# Patient Record
Sex: FEMALE | Race: WHITE | NOT HISPANIC OR LATINO | Employment: FULL TIME | URBAN - NONMETROPOLITAN AREA
[De-identification: names, ages, dates, MRNs, and addresses within clinical notes are randomized per-mention and may not be internally consistent; named-entity substitution may affect disease eponyms.]

---

## 2018-03-27 ENCOUNTER — OFFICE VISIT CONVERTED (OUTPATIENT)
Dept: FAMILY MEDICINE CLINIC | Age: 38
End: 2018-03-27
Attending: NURSE PRACTITIONER

## 2021-05-18 NOTE — PROGRESS NOTES
Lamar Duncan 1980     Office/Outpatient Visit    Visit Date: Tue, Mar 27, 2018 04:13 pm    Provider: Elvia Bryson N.P. (Assistant: Quita Bryson MA)    Location: [facility]        Electronically signed by Elvia Bryson N.P. on  2018 10:00:25 PM                             SUBJECTIVE:        CC:     Ms. Duncan is a 37 year old White female.  Patient complains  of heart fluttering and headaches.;         HPI:         Ms. Duncan presents with palpitations.  This first began approximately 2-3 weeks ago.  She describes the sensation as rapid heart beat and frequent skipped beats.  The frequency of the episodes is once every few days.  The average duration of each episode is few seconds to an hour.  There are no identifiable aggravating factors.  No associated symptoms are reported.      ROS:     CONSTITUTIONAL:  Negative for chills, fatigue, fever, and weight change.      EYES:  Negative for eye changes.      CARDIOVASCULAR:  Negative for chest pain.      RESPIRATORY:  Negative for cough, dyspnea, and hemoptysis.      GASTROINTESTINAL:  Negative for nausea.      NEUROLOGICAL:  Positive for headaches ( severe headache/distal to the temples ).  headaches usually in her neck, today different type headache         PMH/FMH/SH:     Last Reviewed on 3/27/2018 04:23 PM by Elvia Bryson    Past Medical History:     UNREMARKABLE         GYNECOLOGICAL HISTORY:        Contraception: partner is S/P vasectomy;         Surgical History:     NONE         Family History:     Father:    Positive for Aortic Aneurysm;     ; Positive for Lung Cancer;     ; Positive for COPD;     Mother: Healthy     Brother(s): Healthy; 3 brother(s) total     Son(s): Healthy; 1 son(s) total     Daughter(s): Healthy; 1 daughter(s) total     Paternal Grandfather:  at age 80's     Paternal Grandmother: Medical history unknown     Maternal Grandfather:  at age 40's; Cause of death was sepsis     Maternal  Grandmother: Healthy Father: Aortic Aneurysm;  Lung Cancer     Mother: Healthy         Social History:     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              Immunizations:     [employer] Flu P13 10/12/2007         Allergies:     Last Reviewed on 3/27/2018 04:15 PM by Quita Bryson    Sulfas:    Cephalosporins:    Clindamycin HCl: abdominal pain, diarrhea (Adverse Reaction)        Current Medications:     Last Reviewed on 3/27/2018 04:15 PM by Quita Bryson    Multivitamins         OBJECTIVE:        Vitals:         Current: 3/27/2018 4:15:21 PM    Ht:  5 ft, 5 in;  Wt: 147.1 lbs;  BMI: 24.5    T: 97 F (oral);  BP: 116/80 mm Hg (left arm, sitting);  P: 63 bpm (left arm (BP Cuff), sitting);  sCr: 0.8 mg/dL;  GFR: 94.38        Exams:     PHYSICAL EXAM:     GENERAL: vital signs recorded - well developed, well nourished;  no apparent distress;     EYES: PERRLA;     E/N/T: EARS:  normal external auditory canals and tympanic membranes;  grossly normal hearing; NOSE: no sinus tenderness; OROPHARYNX:  normal mucosa, dentition, gingiva, and posterior pharynx;     NECK: carotid exam reveals no bruits;     RESPIRATORY: normal respiratory rate and pattern with no distress; normal breath sounds with no rales, rhonchi, wheezes or rubs;     CARDIOVASCULAR: normal rate; rhythm is regular;  no systolic murmur; no edema;     MUSCULOSKELETAL: no tenderness to palpation of temples;     NEUROLOGIC: GROSSLY INTACT     PSYCHIATRIC:  appropriate affect and demeanor; normal speech pattern; grossly normal memory;         ASSESSMENT           785.1   R00.2  Palpitations              DDx:     784.0   R51  Headache              DDx:         ORDERS:         Meds Prescribed:       Refill of: Tylenol w/Codeine #3 (Acetaminophen/Codeine) Tablet 1 qhs  #1 (One)  tablet(s) Refills: 0         Radiology/Test Orders:       [id number]  Holter monitor connection and disconnection  (In-House)  (48 hour holter placed./tls)                 PLAN:          Palpitations had labs 2 weeks ago, will get me a copy of those labs         TESTS/PROCEDURES:  Will proceed with Holter Monitor 48 hour to be performed/scheduled now.      FOLLOW-UP:    - after 48 holter     RECOMMENDATIONS:    -  avoid caffiene /stimulants           Orders:       [id number]  Holter monitor connection and disconnection  (In-House)  (48 hour holter placed./tls)           Patient Education Handouts:       Premature Ventricular Contractions (PVCs)           Headache she took Ibuprofen without relief, will give her one tyl #3 and see if it helps, can go back to Ibuprofen or try tylenol OTC         FOLLOW-UP: Advised to call if there is no improvement in 1-2 day(s).            Prescriptions:       Refill of: Tylenol w/Codeine #3 (Acetaminophen/Codeine) Tablet 1 qhs  #1 (One) tablet(s) Refills: 0             Patient Recommendations:        For  Palpitations:         FOLLOW-UP: - after 48 holter     RECOMMENDATIONS:    -             CHARGE CAPTURE           **Please note: ICD descriptions below are intended for billing purposes only and may not represent clinical diagnoses**        Primary Diagnosis:         785.1 Palpitations            R00.2    Palpitations              Orders:          82649   Office/outpatient visit; established patient, level 4  (In-House)             79980   Holter monitor connection and disconnection  (In-House)  (48 hour holter placed./tls)         784.0 Headache            R51    Headache        ADDENDUMS:      ____________________________________    Addendum: 04/09/2018 08:56 AM - Melissa Hayward         Visit Note Faxed to:        Tito Long  (Cardiology); Number [phone]

## 2021-07-01 VITALS
SYSTOLIC BLOOD PRESSURE: 116 MMHG | HEIGHT: 65 IN | HEART RATE: 63 BPM | BODY MASS INDEX: 24.51 KG/M2 | DIASTOLIC BLOOD PRESSURE: 80 MMHG | TEMPERATURE: 97 F | WEIGHT: 147.1 LBS

## 2022-03-16 ENCOUNTER — TRANSCRIBE ORDERS (OUTPATIENT)
Dept: ADMINISTRATIVE | Facility: HOSPITAL | Age: 42
End: 2022-03-16

## 2022-03-16 DIAGNOSIS — Z12.31 VISIT FOR SCREENING MAMMOGRAM: Primary | ICD-10-CM

## 2022-04-05 PROBLEM — N87.9 CERVICAL DYSPLASIA: Status: ACTIVE | Noted: 2022-04-05

## 2022-04-06 ENCOUNTER — TRANSCRIBE ORDERS (OUTPATIENT)
Dept: FAMILY MEDICINE CLINIC | Age: 42
End: 2022-04-06

## 2022-04-06 DIAGNOSIS — Z01.818 PREOPERATIVE CLEARANCE: Primary | ICD-10-CM

## 2022-04-18 ENCOUNTER — CLINICAL SUPPORT (OUTPATIENT)
Dept: FAMILY MEDICINE CLINIC | Age: 42
End: 2022-04-18

## 2022-04-18 DIAGNOSIS — Z01.818 PREOPERATIVE CLEARANCE: ICD-10-CM

## 2022-04-18 PROCEDURE — 99211 OFF/OP EST MAY X REQ PHY/QHP: CPT | Performed by: FAMILY MEDICINE

## 2022-04-18 PROCEDURE — U0004 COV-19 TEST NON-CDC HGH THRU: HCPCS | Performed by: OBSTETRICS & GYNECOLOGY

## 2022-04-19 LAB — SARS-COV-2 RNA PNL SPEC NAA+PROBE: NOT DETECTED

## 2022-04-28 ENCOUNTER — APPOINTMENT (OUTPATIENT)
Dept: MAMMOGRAPHY | Facility: HOSPITAL | Age: 42
End: 2022-04-28

## 2022-06-08 PROBLEM — O02.1 MISSED ABORTION: Status: ACTIVE | Noted: 2022-06-08

## 2022-06-10 ENCOUNTER — CLINICAL SUPPORT (OUTPATIENT)
Dept: FAMILY MEDICINE CLINIC | Age: 42
End: 2022-06-10

## 2022-06-10 DIAGNOSIS — Z01.818 PREOPERATIVE CLEARANCE: Primary | ICD-10-CM

## 2022-06-10 LAB — SARS-COV-2 RNA PNL SPEC NAA+PROBE: NOT DETECTED

## 2022-06-10 PROCEDURE — U0004 COV-19 TEST NON-CDC HGH THRU: HCPCS | Performed by: FAMILY MEDICINE

## 2022-06-10 PROCEDURE — 99211 OFF/OP EST MAY X REQ PHY/QHP: CPT | Performed by: FAMILY MEDICINE

## 2022-06-13 NOTE — H&P
[facility]   PREOPERATIVE HISTORY AND PHYSICAL    Patient Name:Lamar Duncan  : 1980  MRN: [id number]  Primary Care Physician: Elvia Bryson APRN  Date of admission: 2022    Subjective   Subjective     Chief Complaint:  Missed .  Cervical dysplasia.     History of Present Illness:   History of Present Illness  Lamar Duncan is a 41 y.o. female with missed  and cervical dysplasia  who presents for preoperative evaluation. She is scheduled for DILATATION AND CURETTAGE WITH SUCTION (N/A), LOOP ELECTROCAUTERY EXCISION PROCEDURE (N/A).    Review of Systems :Negative    Personal History     Past Medical History:   Diagnosis Date   • Cervical dysplasia    • Missed     • MVP (mitral valve prolapse)     no c/o cp or soa, follows w/ a.  osiris       History reviewed. No pertinent surgical history.    Family History: Her family history is not on file.     Social History: She  reports that she has never smoked. She has never used smokeless tobacco. She reports that she does not drink alcohol and does not use drugs.    Home Medications:       Allergies:  She is allergic to bactrim [sulfamethoxazole-trimethoprim].    Objective    Objective     Vitals:    AFVSS    Physical Exam:  Lungs:CTA shanita  Heart:RRR  Abdomen:Soft NT    Assessment & Plan   Assessment / Plan     Brief Patient Summary:  Lamar Duncan is a 41 y.o. female who presents for preoperative evaluation.    Pre-Op Diagnosis Codes:     * Missed  [O02.1]    Active Hospital Problems:  Active Hospital Problems    Diagnosis    • **Missed       Plan:   Procedure(s):  DILATATION AND CURETTAGE WITH SUCTION  LOOP ELECTROCAUTERY EXCISION PROCEDURE    The risks, benefits, and alternatives of the procedure were discussed in detail with the patient and questions were answered. No guarantees were made or implied. Informed consent was obtained.

## 2022-06-14 ENCOUNTER — ANESTHESIA (OUTPATIENT)
Dept: PERIOP | Facility: HOSPITAL | Age: 42
End: 2022-06-14

## 2022-06-14 ENCOUNTER — HOSPITAL ENCOUNTER (OUTPATIENT)
Facility: HOSPITAL | Age: 42
Setting detail: HOSPITAL OUTPATIENT SURGERY
Discharge: HOME | End: 2022-06-14
Attending: OBSTETRICS & GYNECOLOGY | Admitting: OBSTETRICS & GYNECOLOGY

## 2022-06-14 ENCOUNTER — ANESTHESIA EVENT (OUTPATIENT)
Dept: PERIOP | Facility: HOSPITAL | Age: 42
End: 2022-06-14

## 2022-06-14 VITALS
WEIGHT: 145.94 LBS | BODY MASS INDEX: 24.32 KG/M2 | SYSTOLIC BLOOD PRESSURE: 96 MMHG | HEART RATE: 54 BPM | DIASTOLIC BLOOD PRESSURE: 61 MMHG | HEIGHT: 65 IN | OXYGEN SATURATION: 100 % | TEMPERATURE: 97.6 F | RESPIRATION RATE: 20 BRPM

## 2022-06-14 DIAGNOSIS — O02.1 MISSED ABORTION: ICD-10-CM

## 2022-06-14 DIAGNOSIS — N87.9 CERVICAL DYSPLASIA: Primary | ICD-10-CM

## 2022-06-14 PROCEDURE — 25010000002 PROPOFOL 10 MG/ML EMULSION: Performed by: NURSE ANESTHETIST, CERTIFIED REGISTERED

## 2022-06-14 PROCEDURE — 25010000002 ONDANSETRON PER 1 MG: Performed by: NURSE ANESTHETIST, CERTIFIED REGISTERED

## 2022-06-14 PROCEDURE — 25010000002 DEXAMETHASONE PER 1 MG: Performed by: NURSE ANESTHETIST, CERTIFIED REGISTERED

## 2022-06-14 PROCEDURE — 25010000002 FENTANYL CITRATE (PF) 50 MCG/ML SOLUTION: Performed by: NURSE ANESTHETIST, CERTIFIED REGISTERED

## 2022-06-14 PROCEDURE — 88307 TISSUE EXAM BY PATHOLOGIST: CPT | Performed by: OBSTETRICS & GYNECOLOGY

## 2022-06-14 PROCEDURE — 88305 TISSUE EXAM BY PATHOLOGIST: CPT | Performed by: OBSTETRICS & GYNECOLOGY

## 2022-06-14 PROCEDURE — 25010000002 MIDAZOLAM PER 1 MG: Performed by: STUDENT IN AN ORGANIZED HEALTH CARE EDUCATION/TRAINING PROGRAM

## 2022-06-14 PROCEDURE — 25010000002 CEFAZOLIN IN DEXTROSE 2-4 GM/100ML-% SOLUTION: Performed by: OBSTETRICS & GYNECOLOGY

## 2022-06-14 RX ORDER — SODIUM CHLORIDE, SODIUM LACTATE, POTASSIUM CHLORIDE, CALCIUM CHLORIDE 600; 310; 30; 20 MG/100ML; MG/100ML; MG/100ML; MG/100ML
125 INJECTION, SOLUTION INTRAVENOUS CONTINUOUS
Status: DISCONTINUED | OUTPATIENT
Start: 2022-06-14 | End: 2022-06-14 | Stop reason: HOSPADM

## 2022-06-14 RX ORDER — GLYCOPYRROLATE 0.2 MG/ML
INJECTION INTRAMUSCULAR; INTRAVENOUS AS NEEDED
Status: DISCONTINUED | OUTPATIENT
Start: 2022-06-14 | End: 2022-06-14 | Stop reason: SURG

## 2022-06-14 RX ORDER — IBUPROFEN 600 MG/1
600 TABLET ORAL EVERY 6 HOURS PRN
Status: DISCONTINUED | OUTPATIENT
Start: 2022-06-14 | End: 2022-06-14 | Stop reason: HOSPADM

## 2022-06-14 RX ORDER — SODIUM CHLORIDE 0.9 % (FLUSH) 0.9 %
3 SYRINGE (ML) INJECTION EVERY 12 HOURS SCHEDULED
Status: DISCONTINUED | OUTPATIENT
Start: 2022-06-14 | End: 2022-06-14 | Stop reason: HOSPADM

## 2022-06-14 RX ORDER — PROMETHAZINE HYDROCHLORIDE 25 MG/1
25 SUPPOSITORY RECTAL ONCE AS NEEDED
Status: DISCONTINUED | OUTPATIENT
Start: 2022-06-14 | End: 2022-06-14 | Stop reason: HOSPADM

## 2022-06-14 RX ORDER — SODIUM CHLORIDE 0.9 % (FLUSH) 0.9 %
10 SYRINGE (ML) INJECTION AS NEEDED
Status: DISCONTINUED | OUTPATIENT
Start: 2022-06-14 | End: 2022-06-14 | Stop reason: HOSPADM

## 2022-06-14 RX ORDER — LIDOCAINE HYDROCHLORIDE 20 MG/ML
INJECTION, SOLUTION EPIDURAL; INFILTRATION; INTRACAUDAL; PERINEURAL AS NEEDED
Status: DISCONTINUED | OUTPATIENT
Start: 2022-06-14 | End: 2022-06-14 | Stop reason: SURG

## 2022-06-14 RX ORDER — ONDANSETRON 2 MG/ML
4 INJECTION INTRAMUSCULAR; INTRAVENOUS ONCE AS NEEDED
Status: DISCONTINUED | OUTPATIENT
Start: 2022-06-14 | End: 2022-06-14 | Stop reason: HOSPADM

## 2022-06-14 RX ORDER — CEFAZOLIN SODIUM 2 G/100ML
2 INJECTION, SOLUTION INTRAVENOUS ONCE
Status: COMPLETED | OUTPATIENT
Start: 2022-06-14 | End: 2022-06-14

## 2022-06-14 RX ORDER — LUGOLS 0.4 G/G
LIQUID TOPICAL AS NEEDED
Status: DISCONTINUED | OUTPATIENT
Start: 2022-06-14 | End: 2022-06-14 | Stop reason: HOSPADM

## 2022-06-14 RX ORDER — ONDANSETRON 2 MG/ML
INJECTION INTRAMUSCULAR; INTRAVENOUS AS NEEDED
Status: DISCONTINUED | OUTPATIENT
Start: 2022-06-14 | End: 2022-06-14 | Stop reason: SURG

## 2022-06-14 RX ORDER — IBUPROFEN 600 MG/1
600 TABLET ORAL EVERY 6 HOURS PRN
Qty: 30 TABLET | Refills: 0 | Status: SHIPPED | OUTPATIENT
Start: 2022-06-14 | End: 2023-03-29

## 2022-06-14 RX ORDER — PROPOFOL 10 MG/ML
VIAL (ML) INTRAVENOUS AS NEEDED
Status: DISCONTINUED | OUTPATIENT
Start: 2022-06-14 | End: 2022-06-14 | Stop reason: SURG

## 2022-06-14 RX ORDER — DEXMEDETOMIDINE HYDROCHLORIDE 100 UG/ML
INJECTION, SOLUTION INTRAVENOUS AS NEEDED
Status: DISCONTINUED | OUTPATIENT
Start: 2022-06-14 | End: 2022-06-14 | Stop reason: SURG

## 2022-06-14 RX ORDER — PROMETHAZINE HYDROCHLORIDE 12.5 MG/1
25 TABLET ORAL ONCE AS NEEDED
Status: DISCONTINUED | OUTPATIENT
Start: 2022-06-14 | End: 2022-06-14 | Stop reason: HOSPADM

## 2022-06-14 RX ORDER — MEPERIDINE HYDROCHLORIDE 25 MG/ML
12.5 INJECTION INTRAMUSCULAR; INTRAVENOUS; SUBCUTANEOUS
Status: DISCONTINUED | OUTPATIENT
Start: 2022-06-14 | End: 2022-06-14 | Stop reason: HOSPADM

## 2022-06-14 RX ORDER — SCOLOPAMINE TRANSDERMAL SYSTEM 1 MG/1
1 PATCH, EXTENDED RELEASE TRANSDERMAL ONCE
Status: DISCONTINUED | OUTPATIENT
Start: 2022-06-14 | End: 2022-06-14 | Stop reason: HOSPADM

## 2022-06-14 RX ORDER — MIDAZOLAM HYDROCHLORIDE 1 MG/ML
2 INJECTION INTRAMUSCULAR; INTRAVENOUS ONCE
Status: COMPLETED | OUTPATIENT
Start: 2022-06-14 | End: 2022-06-14

## 2022-06-14 RX ORDER — ACETAMINOPHEN 500 MG
1000 TABLET ORAL ONCE
Status: COMPLETED | OUTPATIENT
Start: 2022-06-14 | End: 2022-06-14

## 2022-06-14 RX ORDER — SODIUM CHLORIDE, SODIUM LACTATE, POTASSIUM CHLORIDE, CALCIUM CHLORIDE 600; 310; 30; 20 MG/100ML; MG/100ML; MG/100ML; MG/100ML
9 INJECTION, SOLUTION INTRAVENOUS CONTINUOUS PRN
Status: DISCONTINUED | OUTPATIENT
Start: 2022-06-14 | End: 2022-06-14 | Stop reason: HOSPADM

## 2022-06-14 RX ORDER — DEXAMETHASONE SODIUM PHOSPHATE 4 MG/ML
INJECTION, SOLUTION INTRA-ARTICULAR; INTRALESIONAL; INTRAMUSCULAR; INTRAVENOUS; SOFT TISSUE AS NEEDED
Status: DISCONTINUED | OUTPATIENT
Start: 2022-06-14 | End: 2022-06-14 | Stop reason: SURG

## 2022-06-14 RX ORDER — FENTANYL CITRATE 50 UG/ML
INJECTION, SOLUTION INTRAMUSCULAR; INTRAVENOUS AS NEEDED
Status: DISCONTINUED | OUTPATIENT
Start: 2022-06-14 | End: 2022-06-14 | Stop reason: SURG

## 2022-06-14 RX ORDER — OXYCODONE HYDROCHLORIDE 5 MG/1
5 TABLET ORAL
Status: DISCONTINUED | OUTPATIENT
Start: 2022-06-14 | End: 2022-06-14 | Stop reason: HOSPADM

## 2022-06-14 RX ORDER — OXYCODONE HYDROCHLORIDE AND ACETAMINOPHEN 5; 325 MG/1; MG/1
1 TABLET ORAL EVERY 4 HOURS PRN
Qty: 15 TABLET | Refills: 0 | Status: SHIPPED | OUTPATIENT
Start: 2022-06-14 | End: 2023-03-29

## 2022-06-14 RX ADMIN — LIDOCAINE HYDROCHLORIDE 100 MG: 20 INJECTION, SOLUTION EPIDURAL; INFILTRATION; INTRACAUDAL; PERINEURAL at 08:43

## 2022-06-14 RX ADMIN — MIDAZOLAM HYDROCHLORIDE 2 MG: 1 INJECTION, SOLUTION INTRAMUSCULAR; INTRAVENOUS at 08:05

## 2022-06-14 RX ADMIN — GLYCOPYRROLATE 0.2 MG: 0.2 INJECTION INTRAMUSCULAR; INTRAVENOUS at 08:49

## 2022-06-14 RX ADMIN — SCOPALAMINE 1 PATCH: 1 PATCH, EXTENDED RELEASE TRANSDERMAL at 08:05

## 2022-06-14 RX ADMIN — PROPOFOL 150 MG: 10 INJECTION, EMULSION INTRAVENOUS at 08:43

## 2022-06-14 RX ADMIN — DEXMEDETOMIDINE HYDROCHLORIDE 10 MCG: 100 INJECTION, SOLUTION, CONCENTRATE INTRAVENOUS at 09:05

## 2022-06-14 RX ADMIN — CEFAZOLIN SODIUM 2 G: 2 INJECTION, SOLUTION INTRAVENOUS at 08:44

## 2022-06-14 RX ADMIN — PROPOFOL 200 MCG/KG/MIN: 10 INJECTION, EMULSION INTRAVENOUS at 08:44

## 2022-06-14 RX ADMIN — DEXMEDETOMIDINE HYDROCHLORIDE 10 MCG: 100 INJECTION, SOLUTION, CONCENTRATE INTRAVENOUS at 08:47

## 2022-06-14 RX ADMIN — ACETAMINOPHEN 1000 MG: 500 TABLET ORAL at 08:05

## 2022-06-14 RX ADMIN — SODIUM CHLORIDE, POTASSIUM CHLORIDE, SODIUM LACTATE AND CALCIUM CHLORIDE 9 ML/HR: 600; 310; 30; 20 INJECTION, SOLUTION INTRAVENOUS at 08:04

## 2022-06-14 RX ADMIN — ONDANSETRON 4 MG: 2 INJECTION INTRAMUSCULAR; INTRAVENOUS at 09:05

## 2022-06-14 RX ADMIN — FENTANYL CITRATE 75 MCG: 50 INJECTION, SOLUTION INTRAMUSCULAR; INTRAVENOUS at 08:50

## 2022-06-14 RX ADMIN — FENTANYL CITRATE 25 MCG: 50 INJECTION, SOLUTION INTRAMUSCULAR; INTRAVENOUS at 08:47

## 2022-06-14 RX ADMIN — DEXAMETHASONE SODIUM PHOSPHATE 8 MG: 4 INJECTION, SOLUTION INTRA-ARTICULAR; INTRALESIONAL; INTRAMUSCULAR; INTRAVENOUS; SOFT TISSUE at 08:49

## 2022-06-14 NOTE — ANESTHESIA PREPROCEDURE EVALUATION
Anesthesia Evaluation     Patient summary reviewed and Nursing notes reviewed   no history of anesthetic complications:  NPO Solid Status: > 8 hours  NPO Liquid Status: > 2 hours           Airway   Mallampati: II  TM distance: >3 FB  No difficulty expected  Dental - normal exam     Pulmonary - negative pulmonary ROS and normal exam   Cardiovascular - normal exam  Exercise tolerance: good (4-7 METS)    (+) valvular problems/murmurs MVP,       Neuro/Psych- negative ROS  GI/Hepatic/Renal/Endo - negative ROS     Musculoskeletal (-) negative ROS    Abdominal  - normal exam   Substance History - negative use     OB/GYN      Comment: Missed   Cervical dysplasia      Other - negative ROS       ROS/Med Hx Other: 11 weeks gestation                  Anesthesia Plan    ASA 2 - emergent     general     (Patient understands anesthesia not responsible for dental damage.)  intravenous induction     Anesthetic plan, risks, benefits, and alternatives have been provided, discussed and informed consent has been obtained with: patient.    Plan discussed with CRNA.        CODE STATUS:

## 2022-06-14 NOTE — OP NOTE
DILATATION AND CURETTAGE WITH SUCTION, LOOP ELECTROCAUTERY EXCISION PROCEDURE  Procedure Report    Patient Name:  Lamar Duncan  YOB: 1980    Date of Surgery:  2022     Indications: Patient is a 41-year-old female with missed .  She also has cervical dysplasia.  She was counseled by option she elected to undergo suction D&C and loop electrode excision procedures.  Risks of the surgery include infection bleeding damage to internal organs including bladder bowel vessels nerves or blood transfusion was cussed patient she consented.    Pre-op Diagnosis:   Missed  [O02.1]   Cervical dysplasia       Post-Op Diagnosis Codes:     * Missed  [O02.1]   Cervical dysplasia    Procedure/CPT® Codes:      Procedure(s):  DILATATION AND CURETTAGE WITH SUCTION  LOOP ELECTROCAUTERY EXCISION PROCEDURE    Staff:  Surgeon(s):  Willie Morales MD         Anesthesia: Choice    Estimated Blood Loss: 20 mL    Implants:    Nothing was implanted during the procedure    Specimen:          Specimens     ID Source Type Tests Collected By Collected At Frozen?    A Products of Conception Tissue · TISSUE PATHOLOGY EXAM   Willie Morales MD 22 0906 No    Description: PRODUCTS OF CONCEPTION     This specimen was not marked as sent.    B Cervix Tissue · TISSUE PATHOLOGY EXAM   Willie Morales MD 22 0907     Description: CERVICAL BIOPSY     This specimen was not marked as sent.              Findings: Uterus sounded to 10 cm.  Large amounts of products of conception obtained.  No gross cervical lesions noted.    Complications: None    Description of Procedure: Patient was taken to the operating room where general anesthesia was obtained with any difficulty.  She was then prepped and draped in normal sterile fashion in the lithotomy position.  A coated speculum was placed in vagina and the cervix was grasped with a single-tooth tenaculum posteriorly.  Uterus sounded to about 10 cm.  Cervix was  then serially dilated.  8 mm curved suction curette was introduced into the endometrial cavity and suction was performed.  This was followed by sharp curettage in the endometrial cavity.  The suction curette was reintroduced 1 more time and further suction was performed.  At this time tenaculum was removed from the cervix and hemostasis was assured.  Attention was then turned to performing the LEEP procedure.  Speculum was attached to a smoke evacuator.  LEEP biopsy was performed without any difficulty.  Ball cautery was used to obtain hemostasis.  In addition Monsel solution was placed on the LEEP bed to ensure further hemostasis.  At this time all instruments removed from the vagina.  Sponge laps and needle counts were correct x2.  Patient was taken to the recovery in stable condition.          Willie Morales MD     Date: 6/14/2022  Time: 09:12 EDT

## 2022-06-14 NOTE — ANESTHESIA POSTPROCEDURE EVALUATION
Patient: Lamar Duncan    Procedure Summary     Date: 22 Room / Location: [facility]  [facility] MA[facility] OR    Anesthesia Start: 837 Anesthesia Stop: 924    Procedures:       DILATATION AND CURETTAGE WITH SUCTION (N/A Vagina)      LOOP ELECTROCAUTERY EXCISION PROCEDURE (N/A Cervix) Diagnosis:       Missed       (Missed  [O02.1])    Surgeons: Willie Morales MD Provider: Latia Mcneill MD    Anesthesia Type: general ASA Status: 2 - Emergent          Anesthesia Type: general    Vitals  Vitals Value Taken Time   BP 93/59 22 0945   Temp 36.3 °C (97.3 °F) 22 0945   Pulse 65 22 0950   Resp 16 22 0945   SpO2 97 % 22 0950   Vitals shown include unvalidated device data.        Post Anesthesia Care and Evaluation    Patient location during evaluation: bedside  Patient participation: complete - patient participated  Level of consciousness: awake  Pain score: 0  Pain management: adequate    Airway patency: patent  Anesthetic complications: No anesthetic complications  PONV Status: none  Cardiovascular status: acceptable and stable  Respiratory status: acceptable and room air  Hydration status: acceptable    Comments: An Anesthesiologist personally participated in the most demanding procedures (including induction and emergence if applicable) in the anesthesia plan, monitored the course of anesthesia administration at frequent intervals and remained physically present and available for immediate diagnosis and treatment of emergencies.

## 2022-06-14 NOTE — DISCHARGE INSTRUCTIONS
DISCHARGE INSTRUCTIONS  GYNECOLOGICAL  PROCEDURES      For your surgery you had:  General anesthesia (you may have a sore throat for the first 24 hours)  IV sedation  Local anesthesia  Monitored anesthesia care  You received a medicated patch for nausea prevention today (behind your ear). It is recommended that you remove it 24-48 hours post-operatively. It must be removed within 72 hours.  You received an anesthesia medication today that can cause hormonal forms of birth control to be ineffective. You should use a different form of birth control (to prevent pregnancy) for 7 days.   You may experience dizziness, drowsiness, or lightheadedness for several hours following surgery.  Do not stay alone today or tonight.  Limit your activity for 24 hours.  Resume your diet slowly.  Follow any special dietary instructions you may have been given by your doctor.  You should not drive or operate machinery, drink alcohol, or sign legally binding documents for 24 hours or while you are taking pain medication.  Last dose of pain medication was given at:  .  NOTIFY YOUR DOCTOR IF YOU EXPERIENCE ANY OF THE FOLLOWING:  Temperature greater than 101 degrees Fahrenheit  Shaking Chills  Redness or excessive drainage from incision  Nausea, vomiting and/or pain that is not controlled by prescribed medications  Increase in bleeding or bleeding that is excessive  Unable to urinate in 6 hours after surgery  If unable to reach your doctor, please go to the closest Emergency Room [] Remove dressing in:     [] You may resume intercourse and the use of tampons as your physician has instructed you.  [] Nothing in the vagina for 2 weeks to include intercourse, douches, or tampons.  [] Vaginal bleeding may be expected for several days with flow decreasing with time and never any heavier than a normal   period.  If you have an ablation, vaginal discharge is expected after bleeding stops.   If you have foul smelling discharge, notify your  physician.  Medications per physician instructions as indicated on Discharge Medication Information Sheet.  You should see   for follow-up care   on   .  Phone number:      SPECIAL INSTRUCTIONS:

## 2022-06-15 LAB
CYTO UR: NORMAL
LAB AP CASE REPORT: NORMAL
LAB AP CLINICAL INFORMATION: NORMAL
PATH REPORT.FINAL DX SPEC: NORMAL
PATH REPORT.GROSS SPEC: NORMAL

## 2022-06-29 ENCOUNTER — TELEPHONE (OUTPATIENT)
Dept: LACTATION | Facility: HOSPITAL | Age: 42
End: 2022-06-29

## 2022-06-29 NOTE — TELEPHONE ENCOUNTER
FSN called to check on this patient's recovery from her D&C and pregnancy loss. She states she is better but did have some very rough days. She has good support from her friends. FSN provided her contact information to be able to help with any concerns/needs that she should have in the future.

## 2022-09-12 ENCOUNTER — HOSPITAL ENCOUNTER (OUTPATIENT)
Dept: MAMMOGRAPHY | Facility: HOSPITAL | Age: 42
Discharge: HOME | End: 2022-09-12
Admitting: OBSTETRICS & GYNECOLOGY

## 2022-09-12 DIAGNOSIS — Z12.31 VISIT FOR SCREENING MAMMOGRAM: ICD-10-CM

## 2022-09-12 PROCEDURE — 77063 BREAST TOMOSYNTHESIS BI: CPT

## 2022-09-12 PROCEDURE — 77067 SCR MAMMO BI INCL CAD: CPT

## 2023-03-27 NOTE — PROGRESS NOTES
Chief Complaint  No chief complaint on file.    Subjective          Lamar Kirk presents to [facility]  History of Present Illness  She is here for physical and to reestablish care.  It has been over 5 years since she was last seen by our practice.    Her tetanus is not up-to-date.  She has not had the COVID vaccines.  Mammogram is up-to-date and benign.      Objective   Vital Signs:   There were no vitals taken for this visit.    Physical Exam   Result Review :   The following data was reviewed by: JUDD Galeano on 03/29/2023:  Mammo Screening Digital Tomosynthesis Bilateral With CAD (09/12/2022 15:09)                  Assessment and Plan    Diagnoses and all orders for this visit:    1. Encounter to establish care (Primary)          Follow Up   No follow-ups on file.  Patient was given instructions and counseling regarding her condition or for health maintenance advice. Please see specific information pulled into the AVS if appropriate.

## 2023-03-29 ENCOUNTER — OFFICE VISIT (OUTPATIENT)
Dept: FAMILY MEDICINE CLINIC | Age: 43
End: 2023-03-29
Payer: COMMERCIAL

## 2023-03-29 ENCOUNTER — LAB (OUTPATIENT)
Dept: LAB | Facility: HOSPITAL | Age: 43
End: 2023-03-29
Payer: COMMERCIAL

## 2023-03-29 VITALS
BODY MASS INDEX: 24.62 KG/M2 | TEMPERATURE: 97.5 F | DIASTOLIC BLOOD PRESSURE: 75 MMHG | HEART RATE: 64 BPM | OXYGEN SATURATION: 100 % | SYSTOLIC BLOOD PRESSURE: 109 MMHG | WEIGHT: 147.8 LBS | HEIGHT: 65 IN

## 2023-03-29 DIAGNOSIS — L65.9 THINNING HAIR: ICD-10-CM

## 2023-03-29 DIAGNOSIS — Z00.00 WELLNESS EXAMINATION: ICD-10-CM

## 2023-03-29 DIAGNOSIS — Z00.00 WELLNESS EXAMINATION: Primary | ICD-10-CM

## 2023-03-29 LAB
ALBUMIN SERPL-MCNC: 4.5 G/DL (ref 3.5–5.2)
ALBUMIN/GLOB SERPL: 1.6 G/DL
ALP SERPL-CCNC: 37 U/L (ref 39–117)
ALT SERPL W P-5'-P-CCNC: 23 U/L (ref 1–33)
ANION GAP SERPL CALCULATED.3IONS-SCNC: 14.7 MMOL/L (ref 5–15)
AST SERPL-CCNC: 26 U/L (ref 1–32)
BILIRUB SERPL-MCNC: 0.4 MG/DL (ref 0–1.2)
BUN SERPL-MCNC: 9 MG/DL (ref 6–20)
BUN/CREAT SERPL: 11.4 (ref 7–25)
CALCIUM SPEC-SCNC: 9.7 MG/DL (ref 8.6–10.5)
CHLORIDE SERPL-SCNC: 99 MMOL/L (ref 98–107)
CHOLEST SERPL-MCNC: 202 MG/DL (ref 0–200)
CO2 SERPL-SCNC: 25.3 MMOL/L (ref 22–29)
CREAT SERPL-MCNC: 0.79 MG/DL (ref 0.57–1)
DEPRECATED RDW RBC AUTO: 40.1 FL (ref 37–54)
EGFRCR SERPLBLD CKD-EPI 2021: 95.3 ML/MIN/1.73
ERYTHROCYTE [DISTWIDTH] IN BLOOD BY AUTOMATED COUNT: 12 % (ref 12.3–15.4)
GLOBULIN UR ELPH-MCNC: 2.9 GM/DL
GLUCOSE SERPL-MCNC: 90 MG/DL (ref 65–99)
HCT VFR BLD AUTO: 39.7 % (ref 34–46.6)
HDLC SERPL-MCNC: 75 MG/DL (ref 40–60)
HGB BLD-MCNC: 13.6 G/DL (ref 12–15.9)
LDLC SERPL CALC-MCNC: 114 MG/DL (ref 0–100)
LDLC/HDLC SERPL: 1.5 {RATIO}
MCH RBC QN AUTO: 31.4 PG (ref 26.6–33)
MCHC RBC AUTO-ENTMCNC: 34.3 G/DL (ref 31.5–35.7)
MCV RBC AUTO: 91.7 FL (ref 79–97)
PLATELET # BLD AUTO: 330 10*3/MM3 (ref 140–450)
PMV BLD AUTO: 9.8 FL (ref 6–12)
POTASSIUM SERPL-SCNC: 4.7 MMOL/L (ref 3.5–5.2)
PROT SERPL-MCNC: 7.4 G/DL (ref 6–8.5)
RBC # BLD AUTO: 4.33 10*6/MM3 (ref 3.77–5.28)
SODIUM SERPL-SCNC: 139 MMOL/L (ref 136–145)
TRIGL SERPL-MCNC: 71 MG/DL (ref 0–150)
TSH SERPL DL<=0.05 MIU/L-ACNC: 1.72 UIU/ML (ref 0.27–4.2)
VLDLC SERPL-MCNC: 13 MG/DL (ref 5–40)
WBC NRBC COR # BLD: 6.33 10*3/MM3 (ref 3.4–10.8)

## 2023-03-29 PROCEDURE — 36415 COLL VENOUS BLD VENIPUNCTURE: CPT

## 2023-03-29 PROCEDURE — 80050 GENERAL HEALTH PANEL: CPT

## 2023-03-29 PROCEDURE — 99396 PREV VISIT EST AGE 40-64: CPT | Performed by: NURSE PRACTITIONER

## 2023-03-29 PROCEDURE — 80061 LIPID PANEL: CPT

## 2023-03-29 NOTE — PROGRESS NOTES
Chief Complaint  Establish Care and Labs Only (Pt states her hair is thinning and her nails thin ).    Subjective          Lamar Kirk presents to [facility]  History of Present Illness  She reports thinning of hair and nails has been a chronic issue. She denies hx of thyroid issues or anemia. States she has a well-balanced diet and exercises routinely. Also denies any stress at this time.       Past Medical History:   • Cervical dysplasia   • Missed    • MVP (mitral valve prolapse)    no c/o cp or soa, follows w/ a.  [place]       Allergies   Allergen Reactions   • Bactrim [Sulfamethoxazole-Trimethoprim] Rash        Past Surgical History:   • D & C WITH SUCTION    Procedure: DILATATION AND CURETTAGE WITH SUCTION;  Surgeon: Willie Morales MD;  Location: [facility] MAIN OR;  Service: Gynecology;  Laterality: N/A;   • LEEP    Procedure: LOOP ELECTROCAUTERY EXCISION PROCEDURE;  Surgeon: Willie Morales MD;  Location: [facility] MAIN OR;  Service: Gynecology;  Laterality: N/A;       Social History     Socioeconomic History   • Marital status:    Tobacco Use   • Smoking status: Never   • Smokeless tobacco: Never   Vaping Use   • Vaping Use: Never used   Substance and Sexual Activity   • Alcohol use: Never   • Drug use: Never       Family History   Problem Relation Age of Onset   • Aortic aneurysm Father    • Lung cancer Father         Last Completed Pap Smear          PAP SMEAR (Every 3 Years) Next due on 2022  Done - Sees Gynevology r/t history of abnormal pap, HPV, and previous LEEP                Last Completed Mammogram     This patient has no relevant Health Maintenance data.          Last Completed Colonoscopy     This patient has no relevant Health Maintenance data.          No current outpatient medications on file.    Medications Discontinued During This Encounter   Medication Reason   • ibuprofen (ADVIL,MOTRIN) 600 MG tablet *Therapy  "completed   • oxyCODONE-acetaminophen (PERCOCET) 5-325 MG per tablet *Therapy completed         There is no immunization history on file for this patient.    Review of Systems   Constitutional: Negative for activity change, appetite change, fatigue, fever, unexpected weight gain and unexpected weight loss.   HENT: Negative for dental problem, postnasal drip, rhinorrhea, sinus pressure and sneezing.    Eyes: Negative for blurred vision, double vision, pain, discharge and visual disturbance.   Respiratory: Negative for chest tightness, shortness of breath and wheezing.    Cardiovascular: Negative for palpitations and leg swelling.   Gastrointestinal: Negative for constipation, diarrhea, GERD and indigestion.   Endocrine: Negative for cold intolerance, heat intolerance, polydipsia, polyphagia and polyuria.   Genitourinary: Negative for amenorrhea, breast discharge, breast lump, breast pain, flank pain, frequency, menstrual problem, pelvic pain, pelvic pressure and urgency.   Skin: Negative for dry skin.   Neurological: Negative for dizziness, seizures, syncope, light-headedness and headache.   Hematological: Does not bruise/bleed easily.   Psychiatric/Behavioral: Negative for agitation and depressed mood.        Objective     Vitals:    03/29/23 1544   BP: 109/75   BP Location: Right arm   Patient Position: Sitting   Cuff Size: Adult   Pulse: 64   Temp: 97.5 °F (36.4 °C)   TempSrc: Oral   SpO2: 100%   Weight: 67 kg (147 lb 12.8 oz)   Height: 165.1 cm (65\")     Body mass index is 24.6 kg/m².         Physical Exam  Constitutional:       General: She is not in acute distress.     Appearance: She is normal weight.   HENT:      Head: Normocephalic.   Neck:      Thyroid: No thyroid mass, thyromegaly or thyroid tenderness.   Cardiovascular:      Rate and Rhythm: Normal rate and regular rhythm.      Comments: Negative for carotid bruits bilaterally.  Pulmonary:      Effort: Pulmonary effort is normal.      Breath sounds: " Normal breath sounds.   Abdominal:      General: Abdomen is flat. There is no distension.      Palpations: Abdomen is soft. There is no mass.      Tenderness: There is no abdominal tenderness. There is no guarding or rebound.      Hernia: No hernia is present.   Musculoskeletal:      Cervical back: No edema or erythema.      Right lower leg: No edema.      Left lower leg: No edema.   Lymphadenopathy:      Head:      Right side of head: No submandibular, preauricular or posterior auricular adenopathy.      Left side of head: No submandibular, preauricular or posterior auricular adenopathy.      Cervical: No cervical adenopathy.      Upper Body:      Right upper body: No supraclavicular adenopathy.      Left upper body: No supraclavicular adenopathy.   Skin:     General: Skin is warm.      Coloration: Skin is not pale.   Neurological:      Mental Status: She is alert and oriented to person, place, and time.   Psychiatric:         Mood and Affect: Mood normal.         Behavior: Behavior normal.       Exercises 30 minutes - 1 hour daily       Result Review :                           Assessment and Plan        Diagnoses and all orders for this visit:    1. Wellness examination (Primary)  -     CBC No Differential; Future  -     TSH Rfx On Abnormal To Free T4; Future  -     Comprehensive metabolic panel; Future  -     Lipid panel; Future    2. Thinning hair  Comments:  Will check labs today such as TSH and CBC. Encouraged use of hair, skin and nail supplement daily with Biotin.      Pap smear UTD (Done 2022 and normal per patient). Encouraged routine eye exams. Encouraged to follow-up for annual exams or sooner if has any issues. Declines Tdap and COVID vaccines today.          Follow Up     Return in about 1 year (around 3/29/2024), or if symptoms worsen or fail to improve, for Annual physical.    Patient was given instructions and counseling regarding her condition or for health maintenance advice. Please see specific  information pulled into the AVS if appropriate.     Lamar Kirk  reports that she has never smoked. She has never used smokeless tobacco.. I have educated her on the risk of diseases from using tobacco products such as cancer, COPD and heart disease.

## 2023-08-11 ENCOUNTER — TRANSCRIBE ORDERS (OUTPATIENT)
Dept: ADMINISTRATIVE | Facility: HOSPITAL | Age: 43
End: 2023-08-11
Payer: COMMERCIAL

## 2023-08-11 DIAGNOSIS — Z12.31 SCREENING MAMMOGRAM, ENCOUNTER FOR: Primary | ICD-10-CM

## 2023-11-07 ENCOUNTER — HOSPITAL ENCOUNTER (OUTPATIENT)
Dept: MAMMOGRAPHY | Facility: HOSPITAL | Age: 43
Discharge: HOME | End: 2023-11-07
Admitting: NURSE PRACTITIONER
Payer: COMMERCIAL

## 2023-11-07 DIAGNOSIS — Z12.31 SCREENING MAMMOGRAM, ENCOUNTER FOR: ICD-10-CM

## 2023-11-07 PROCEDURE — 77067 SCR MAMMO BI INCL CAD: CPT

## 2023-11-07 PROCEDURE — 77063 BREAST TOMOSYNTHESIS BI: CPT

## 2024-02-21 ENCOUNTER — OFFICE VISIT (OUTPATIENT)
Dept: FAMILY MEDICINE CLINIC | Age: 44
End: 2024-02-21
Payer: COMMERCIAL

## 2024-02-21 ENCOUNTER — LAB (OUTPATIENT)
Dept: LAB | Facility: HOSPITAL | Age: 44
End: 2024-02-21
Payer: COMMERCIAL

## 2024-02-21 VITALS
DIASTOLIC BLOOD PRESSURE: 84 MMHG | BODY MASS INDEX: 25.16 KG/M2 | OXYGEN SATURATION: 98 % | HEIGHT: 65 IN | SYSTOLIC BLOOD PRESSURE: 121 MMHG | HEART RATE: 56 BPM | WEIGHT: 151 LBS

## 2024-02-21 DIAGNOSIS — Z00.00 ROUTINE GENERAL MEDICAL EXAMINATION AT A HEALTH CARE FACILITY: ICD-10-CM

## 2024-02-21 DIAGNOSIS — Z00.00 ROUTINE GENERAL MEDICAL EXAMINATION AT A HEALTH CARE FACILITY: Primary | ICD-10-CM

## 2024-02-21 DIAGNOSIS — Z86.79 HISTORY OF MITRAL VALVE PROLAPSE: ICD-10-CM

## 2024-02-21 LAB
ALBUMIN SERPL-MCNC: 3.8 G/DL (ref 3.5–5.2)
ALBUMIN/GLOB SERPL: 1.2 G/DL
ALP SERPL-CCNC: 37 U/L (ref 39–117)
ALT SERPL W P-5'-P-CCNC: 19 U/L (ref 1–33)
ANION GAP SERPL CALCULATED.3IONS-SCNC: 10.8 MMOL/L (ref 5–15)
AST SERPL-CCNC: 26 U/L (ref 1–32)
BASOPHILS # BLD AUTO: 0.06 10*3/MM3 (ref 0–0.2)
BASOPHILS NFR BLD AUTO: 0.9 % (ref 0–1.5)
BILIRUB SERPL-MCNC: 0.5 MG/DL (ref 0–1.2)
BUN SERPL-MCNC: 11 MG/DL (ref 6–20)
BUN/CREAT SERPL: 12.4 (ref 7–25)
CALCIUM SPEC-SCNC: 9 MG/DL (ref 8.6–10.5)
CHLORIDE SERPL-SCNC: 105 MMOL/L (ref 98–107)
CHOLEST SERPL-MCNC: 187 MG/DL (ref 0–200)
CO2 SERPL-SCNC: 22.2 MMOL/L (ref 22–29)
CREAT SERPL-MCNC: 0.89 MG/DL (ref 0.57–1)
DEPRECATED RDW RBC AUTO: 38.2 FL (ref 37–54)
EGFRCR SERPLBLD CKD-EPI 2021: 82.1 ML/MIN/1.73
EOSINOPHIL # BLD AUTO: 0.1 10*3/MM3 (ref 0–0.4)
EOSINOPHIL NFR BLD AUTO: 1.6 % (ref 0.3–6.2)
ERYTHROCYTE [DISTWIDTH] IN BLOOD BY AUTOMATED COUNT: 11.9 % (ref 12.3–15.4)
GLOBULIN UR ELPH-MCNC: 3.1 GM/DL
GLUCOSE SERPL-MCNC: 94 MG/DL (ref 65–99)
HCT VFR BLD AUTO: 36.9 % (ref 34–46.6)
HDLC SERPL-MCNC: 72 MG/DL (ref 40–60)
HGB BLD-MCNC: 12.4 G/DL (ref 12–15.9)
IMM GRANULOCYTES # BLD AUTO: 0.01 10*3/MM3 (ref 0–0.05)
IMM GRANULOCYTES NFR BLD AUTO: 0.2 % (ref 0–0.5)
LDLC SERPL CALC-MCNC: 102 MG/DL (ref 0–100)
LDLC/HDLC SERPL: 1.4 {RATIO}
LYMPHOCYTES # BLD AUTO: 2.3 10*3/MM3 (ref 0.7–3.1)
LYMPHOCYTES NFR BLD AUTO: 36.2 % (ref 19.6–45.3)
MCH RBC QN AUTO: 30 PG (ref 26.6–33)
MCHC RBC AUTO-ENTMCNC: 33.6 G/DL (ref 31.5–35.7)
MCV RBC AUTO: 89.1 FL (ref 79–97)
MONOCYTES # BLD AUTO: 0.43 10*3/MM3 (ref 0.1–0.9)
MONOCYTES NFR BLD AUTO: 6.8 % (ref 5–12)
NEUTROPHILS NFR BLD AUTO: 3.46 10*3/MM3 (ref 1.7–7)
NEUTROPHILS NFR BLD AUTO: 54.3 % (ref 42.7–76)
NRBC BLD AUTO-RTO: 0 /100 WBC (ref 0–0.2)
PLATELET # BLD AUTO: 389 10*3/MM3 (ref 140–450)
PMV BLD AUTO: 9.7 FL (ref 6–12)
POTASSIUM SERPL-SCNC: 4.5 MMOL/L (ref 3.5–5.2)
PROT SERPL-MCNC: 6.9 G/DL (ref 6–8.5)
RBC # BLD AUTO: 4.14 10*6/MM3 (ref 3.77–5.28)
SODIUM SERPL-SCNC: 138 MMOL/L (ref 136–145)
TRIGL SERPL-MCNC: 70 MG/DL (ref 0–150)
TSH SERPL DL<=0.05 MIU/L-ACNC: 1.38 UIU/ML (ref 0.27–4.2)
VLDLC SERPL-MCNC: 13 MG/DL (ref 5–40)
WBC NRBC COR # BLD AUTO: 6.36 10*3/MM3 (ref 3.4–10.8)

## 2024-02-21 PROCEDURE — 99396 PREV VISIT EST AGE 40-64: CPT | Performed by: NURSE PRACTITIONER

## 2024-02-21 PROCEDURE — 80061 LIPID PANEL: CPT

## 2024-02-21 PROCEDURE — 80050 GENERAL HEALTH PANEL: CPT

## 2024-02-21 PROCEDURE — 36415 COLL VENOUS BLD VENIPUNCTURE: CPT

## 2024-02-21 RX ORDER — NORETHINDRONE ACETATE/ETHINYL ESTRADIOL 1MG-20MCG
1 KIT ORAL DAILY
COMMUNITY
End: 2024-02-21

## 2024-02-21 NOTE — ASSESSMENT & PLAN NOTE
Follow a healthy diet and get regular exercise.  Always wear seat belts.    She has fasted all day, sent to lab.  Discussed vaccines, not interested today.  Discussed when she will be due for screening CLN.

## 2024-02-21 NOTE — PROGRESS NOTES
Chief Complaint  Annual Exam    Subjective          Lamar Kirk presents to [facility]    History of Present Illness  General Health Questions  Regular exercise as least 3 days a week: yes   Follows a healthy diet: yes   Wears seat belt always: yes   Drinks Alcohol: rarely   Uses Recreational drugs: no  Uses tobacco products: no   UTD on Tetanus vaccine: unsure   Does BSE:yes   Last mammogram: 2023, normal, did see DR RADHA WILLIAMSON on pap / last pap 10-    PM/[employer]/ changes since 3-2018:         History of MVP saw KENNY Fay in the past and wore holter and had echo      GYNECOLOGICAL HISTORY:     SAB missed      Contraception: not currently sexually active         Surgical History:      22 D&C cervical dysplasia         Family History:     Father: Aortic Aneurysm;  Lung Cancer; Positive for COPD;     Mother: Healthy     Brother(s): Healthy; 3 brother(s) total     Son(s): Healthy; 1 son(s) total     Daughter(s): Healthy; 1 daughter(s) total     Paternal Grandfather:  at age 80's     Paternal Grandmother: Medical history unknown     Maternal Grandfather:  at age 40's; Cause of death was sepsis     Maternal Grandmother: Healthy         Social History:       Occupation: [employer]     Marital Status:     Children: 2 children           Past Medical History:   Diagnosis Date    Cervical dysplasia     Missed      MVP (mitral valve prolapse)     no c/o cp or soa, follows w/ a.  newell       Allergies   Allergen Reactions    Bactrim [Sulfamethoxazole-Trimethoprim] Rash        Past Surgical History:   Procedure Laterality Date    D & C WITH SUCTION N/A 2022    Procedure: DILATATION AND CURETTAGE WITH SUCTION;  Surgeon: Willie Morales MD;  Location: [facility] MAIN OR;  Service: Gynecology;  Laterality: N/A;    LEEP N/A 2022    Procedure: LOOP ELECTROCAUTERY EXCISION PROCEDURE;  Surgeon: Willie Morales MD;   "Location: [facility] MAIN OR;  Service: Gynecology;  Laterality: N/A;        Social History     Tobacco Use    Smoking status: Never    Smokeless tobacco: Never   Substance Use Topics    Alcohol use: Never       Family History   Problem Relation Age of Onset    Aortic aneurysm Father     Lung cancer Father         Health Maintenance Due   Topic Date Due    BMI FOLLOWUP  Never done    COVID-19 Vaccine (1) Never done        No current outpatient medications on file prior to visit.     No current facility-administered medications on file prior to visit.         There is no immunization history on file for this patient.    Review of Systems   Constitutional:  Negative for fatigue and fever.   HENT:  Negative for ear pain and sore throat.    Eyes:  Negative for blurred vision.   Respiratory:  Negative for cough and shortness of breath.    Cardiovascular:  Positive for palpitations (occ irr heart beat, not often). Negative for chest pain and leg swelling.   Gastrointestinal:  Positive for constipation (chronic issue). Negative for abdominal pain, blood in stool, diarrhea, nausea and vomiting.   Musculoskeletal:  Negative for arthralgias and myalgias.   Skin:  Negative for rash.   Neurological:  Negative for dizziness, weakness and headache.   Psychiatric/Behavioral:  Negative for sleep disturbance and depressed mood.         Objective     Vitals:    02/21/24 1628   BP: 121/84   Pulse: 56   SpO2: 98%  Comment: room air   Weight: 68.5 kg (151 lb)   Height: 165.1 cm (65\")            Physical Exam  Vitals reviewed.   Constitutional:       Appearance: Normal appearance. She is well-developed.   HENT:      Head: Normocephalic and atraumatic.      Right Ear: External ear normal.      Left Ear: External ear normal.      Mouth/Throat:      Pharynx: No oropharyngeal exudate.   Eyes:      Conjunctiva/sclera: Conjunctivae normal.      Pupils: Pupils are equal, round, and reactive to light.   Cardiovascular:      Rate and Rhythm: Normal " rate and regular rhythm.      Heart sounds: No murmur heard.     No friction rub. No gallop.   Pulmonary:      Effort: Pulmonary effort is normal.      Breath sounds: Normal breath sounds. No wheezing or rhonchi.   Abdominal:      General: Bowel sounds are normal. There is no distension.      Palpations: Abdomen is soft.      Tenderness: There is no abdominal tenderness.      Comments:       Skin:     General: Skin is warm and dry.   Neurological:      Mental Status: She is alert and oriented to person, place, and time.      Cranial Nerves: No cranial nerve deficit.   Psychiatric:         Mood and Affect: Mood and affect normal.         Behavior: Behavior normal.         Thought Content: Thought content normal.         Judgment: Judgment normal.         Result Review :     The following data was reviewed by: JUDD Beyer on 02/21/2024:              BMI is >= 25 and <30. (Overweight) The following options were offered after discussion;: exercise counseling/recommendations and nutrition counseling/recommendations           Assessment and Plan      Diagnoses and all orders for this visit:    1. Routine general medical examination at a health care facility (Primary)  Assessment & Plan:  Follow a healthy diet and get regular exercise.  Always wear seat belts.    She has fasted all day, sent to lab.  Discussed vaccines, not interested today.  Discussed when she will be due for screening CLN.      Orders:  -     Comprehensive Metabolic Panel; Future  -     CBC & Differential; Future  -     TSH; Future  -     Lipid Panel; Future    2. History of mitral valve prolapse  Assessment & Plan:  Get records from [facility] re holter, echo and cardiology, send back to cardiology     Orders:  -     Ambulatory Referral to Cardiology        BMI is within normal parameters. No other follow-up for BMI required.           Follow Up     Return for followup pending lab results.    Patient was given instructions and counseling  regarding her condition or for health maintenance advice. Please see specific information pulled into the AVS if appropriate.

## 2024-06-04 ENCOUNTER — OFFICE VISIT (OUTPATIENT)
Dept: FAMILY MEDICINE CLINIC | Age: 44
End: 2024-06-04
Payer: COMMERCIAL

## 2024-06-04 VITALS
BODY MASS INDEX: 24.54 KG/M2 | OXYGEN SATURATION: 100 % | HEART RATE: 62 BPM | HEIGHT: 65 IN | DIASTOLIC BLOOD PRESSURE: 78 MMHG | SYSTOLIC BLOOD PRESSURE: 106 MMHG | WEIGHT: 147.3 LBS | RESPIRATION RATE: 14 BRPM

## 2024-06-04 DIAGNOSIS — K21.9 GASTROESOPHAGEAL REFLUX DISEASE WITHOUT ESOPHAGITIS: Primary | ICD-10-CM

## 2024-06-04 PROCEDURE — 99213 OFFICE O/P EST LOW 20 MIN: CPT | Performed by: NURSE PRACTITIONER

## 2024-06-04 RX ORDER — PANTOPRAZOLE SODIUM 40 MG/1
40 TABLET, DELAYED RELEASE ORAL DAILY
Qty: 30 TABLET | Refills: 1 | Status: SHIPPED | OUTPATIENT
Start: 2024-06-04

## 2024-06-04 NOTE — PROGRESS NOTES
"Chief Complaint  Sore Throat (2 months/) and Heartburn (2 months )    Subjective        Lamar Kirk presents to [facility] today for reports of sore throat and heartburn for about 2 months.  She is sensitive to gluten and dairy, and was not sticking to her diet when the symptoms started, in the last week she has tried to get back on her dairy free diet and gluten-free diet but the sore throat and heartburn has not went away.  Denies fever, cough, headache nausea vomiting diarrhea or other symptoms.  Has not taken any over-the-counter meds to help treat the symptoms.      Current Outpatient Medications:     pantoprazole (PROTONIX) 40 MG EC tablet, Take 1 tablet by mouth Daily., Disp: 30 tablet, Rfl: 1  There are no discontinued medications.      Allergies:  Bactrim [sulfamethoxazole-trimethoprim]      Objective   Vital Signs:   Vitals:    06/04/24 1333   BP: 106/78   BP Location: Right arm   Patient Position: Sitting   Cuff Size: Adult   Pulse: 62   Resp: 14   SpO2: 100%  Comment: room air   Weight: 66.8 kg (147 lb 4.8 oz)   Height: 165.1 cm (65\")     Body mass index is 24.51 kg/m².  BMI is within normal parameters. No other follow-up for BMI required.        Physical Exam  Constitutional:       Appearance: Normal appearance.   HENT:      Right Ear: Tympanic membrane normal.      Left Ear: Tympanic membrane normal.      Mouth/Throat:      Comments: Slight posterior erythema  Neck:      Vascular: No carotid bruit.   Cardiovascular:      Rate and Rhythm: Normal rate and regular rhythm.      Heart sounds: Normal heart sounds.   Pulmonary:      Effort: Pulmonary effort is normal.      Breath sounds: Normal breath sounds.   Abdominal:      General: Bowel sounds are normal.      Tenderness: There is no abdominal tenderness.   Musculoskeletal:         General: Normal range of motion.   Skin:     General: Skin is warm and dry.   Neurological:      General: No focal deficit present.      " Mental Status: She is alert.   Psychiatric:         Mood and Affect: Mood normal.         Behavior: Behavior normal.             Lab Results   Component Value Date    GLUCOSE 94 02/21/2024    BUN 11 02/21/2024    CREATININE 0.89 02/21/2024    BCR 12.4 02/21/2024    K 4.5 02/21/2024    CO2 22.2 02/21/2024    CALCIUM 9.0 02/21/2024    ALBUMIN 3.8 02/21/2024    AST 26 02/21/2024    ALT 19 02/21/2024       Lab Results   Component Value Date    CHOL 187 02/21/2024    TRIG 70 02/21/2024    HDL 72 (H) 02/21/2024     (H) 02/21/2024       Lab Results   Component Value Date    WBC 6.36 02/21/2024    HGB 12.4 02/21/2024    HCT 36.9 02/21/2024    MCV 89.1 02/21/2024     02/21/2024       Procedures         Diagnoses and all orders for this visit:    1. Gastroesophageal reflux disease without esophagitis (Primary)  Comments:  Will try Protonix daily for 4 to 6 weeks, if not improving return to office may need an upper GI scope  Orders:  -     pantoprazole (PROTONIX) 40 MG EC tablet; Take 1 tablet by mouth Daily.  Dispense: 30 tablet; Refill: 1            Follow Up  Return in about 6 weeks (around 7/16/2024), or if symptoms worsen or fail to improve, for If not improving or symptoms are getting worse.  Patient was given instructions and counseling regarding her condition or for health maintenance advice. Please see specific information pulled into the AVS if appropriate.           Charlotte Medina, APRN  06/04/2024    Please note that portions of this document were completed using a voice recognition program.

## 2024-07-15 ENCOUNTER — OFFICE VISIT (OUTPATIENT)
Dept: FAMILY MEDICINE CLINIC | Age: 44
End: 2024-07-15
Payer: COMMERCIAL

## 2024-07-15 VITALS
WEIGHT: 147.5 LBS | HEIGHT: 65 IN | BODY MASS INDEX: 24.57 KG/M2 | SYSTOLIC BLOOD PRESSURE: 118 MMHG | DIASTOLIC BLOOD PRESSURE: 80 MMHG | TEMPERATURE: 97.8 F | OXYGEN SATURATION: 100 % | HEART RATE: 55 BPM

## 2024-07-15 DIAGNOSIS — Z86.79 HISTORY OF MITRAL VALVE PROLAPSE: ICD-10-CM

## 2024-07-15 DIAGNOSIS — R13.10 DYSPHAGIA, UNSPECIFIED TYPE: ICD-10-CM

## 2024-07-15 DIAGNOSIS — K21.9 GASTROESOPHAGEAL REFLUX DISEASE WITHOUT ESOPHAGITIS: Primary | ICD-10-CM

## 2024-07-15 PROCEDURE — 99214 OFFICE O/P EST MOD 30 MIN: CPT | Performed by: NURSE PRACTITIONER

## 2024-07-15 RX ORDER — PANTOPRAZOLE SODIUM 40 MG/1
40 TABLET, DELAYED RELEASE ORAL DAILY
Qty: 30 TABLET | Refills: 1 | Status: SHIPPED | OUTPATIENT
Start: 2024-07-15

## 2024-07-15 NOTE — PROGRESS NOTES
Chief Complaint  Heartburn (Pt still concerned about the feeling of having to clear her throat constantly/)    Subjective          Lamar Kirk presents to [facility]    History of Present Illness  GI symptoms:   GERD/heartburn  Symptoms started: over 4 months ago   Associated symptoms: burning in her throat , occ dysphagia   Treatment tried: Protonix 40 mg daily has helped     PMH/FMH/SH changes since 2024:          History of MVP saw KENNY Fay in the past and wore holter and had echo       GYNECOLOGICAL HISTORY:     SAB missed      Contraception: not currently sexually active         Surgical History:      21 D&C cervical dysplasia         Family History:       Father: Aortic Aneurysm;  Lung Cancer; Positive for COPD;     Mother: Healthy     Brother(s): Healthy; 3 brother(s) total     Son(s): Healthy; 1 son(s) total     Daughter(s): Healthy; 1 daughter(s) total     Paternal Grandfather:  at age 80's     Paternal Grandmother: Medical history unknown     Maternal Grandfather:  at age 40's; Cause of death was sepsis     Maternal Grandmother: Healthy         Social History:        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  newell       Allergies   Allergen Reactions    Bactrim [Sulfamethoxazole-Trimethoprim] Rash        Past Surgical History:   Procedure Laterality Date    D & C WITH SUCTION N/A 2022    Procedure: DILATATION AND CURETTAGE WITH SUCTION;  Surgeon: Willie Morales MD;  Location: [facility] MAIN OR;  Service: Gynecology;  Laterality: N/A;    LEEP N/A 2022    Procedure: LOOP ELECTROCAUTERY EXCISION PROCEDURE;  Surgeon: Willie Morales MD;  Location: [facility] MAIN OR;  Service: Gynecology;  Laterality: N/A;     "    Social History     Tobacco Use    Smoking status: Never    Smokeless tobacco: Never   Substance Use Topics    Alcohol use: Never       Family History   Problem Relation Age of Onset    Aortic aneurysm Father     Lung cancer Father         Health Maintenance Due   Topic Date Due    TDAP/TD VACCINES (1 - Tdap) Never done    COVID-19 Vaccine (1 - 2023-24 season) Never done        Current Outpatient Medications on File Prior to Visit   Medication Sig    [DISCONTINUED] pantoprazole (PROTONIX) 40 MG EC tablet Take 1 tablet by mouth Daily.     No current facility-administered medications on file prior to visit.         There is no immunization history on file for this patient.    Review of Systems   Constitutional:  Negative for fatigue and fever.   Respiratory:  Negative for cough and shortness of breath.    Cardiovascular:  Negative for chest pain, palpitations and leg swelling.        Objective     Vitals:    07/15/24 1636   BP: 118/80   BP Location: Left arm   Patient Position: Sitting   Pulse: 55   Temp: 97.8 °F (36.6 °C)   TempSrc: Oral   SpO2: 100%  Comment: room air   Weight: 66.9 kg (147 lb 8 oz)   Height: 165.1 cm (65\")            Physical Exam  Vitals reviewed.   Constitutional:       General: She is not in acute distress.     Appearance: Normal appearance.   Cardiovascular:      Rate and Rhythm: Normal rate and regular rhythm.      Heart sounds: Normal heart sounds. No murmur heard.  Pulmonary:      Effort: Pulmonary effort is normal. No respiratory distress.      Breath sounds: Normal breath sounds.   Musculoskeletal:      Cervical back: Neck supple.      Right lower leg: No edema.      Left lower leg: No edema.   Neurological:      Mental Status: She is alert.   Psychiatric:         Mood and Affect: Mood normal.         Behavior: Behavior normal.         Result Review :     The following data was reviewed by: JUDD Beyer on 07/15/2024:                       Assessment and Plan  "     Diagnoses and all orders for this visit:    1. Gastroesophageal reflux disease without esophagitis (Primary)  Assessment & Plan:  Send to , continue PPI, discussed lifestyle modifications   Reviewed last labs,  Discussed she will be 44 later this year and has not had a CLN    Orders:  -     Ambulatory Referral to Gastroenterology  -     pantoprazole (PROTONIX) 40 MG EC tablet; Take 1 tablet by mouth Daily.  Dispense: 30 tablet; Refill: 1    2. Dysphagia, unspecified type  Assessment & Plan:  Send to      Orders:  -     Ambulatory Referral to Gastroenterology    3. History of mitral valve prolapse  Assessment & Plan:  Reviewed last cardiology note, she has a follow up appt           BMI is within normal parameters. No other follow-up for BMI required.           Follow Up     Return if symptoms worsen or fail to improve.    Patient was given instructions and counseling regarding her condition or for health maintenance advice. Please see specific information pulled into the AVS if appropriate.

## 2024-07-15 NOTE — ASSESSMENT & PLAN NOTE
Send to [facility], continue PPI, discussed lifestyle modifications   Reviewed last labs,  Discussed she will be 44 later this year and has not had a CLN

## 2024-08-06 ENCOUNTER — TELEPHONE (OUTPATIENT)
Dept: FAMILY MEDICINE CLINIC | Age: 44
End: 2024-08-06
Payer: COMMERCIAL

## 2024-08-06 RX ORDER — PANTOPRAZOLE SODIUM 20 MG/1
20 TABLET, DELAYED RELEASE ORAL DAILY
Qty: 90 TABLET | Refills: 0 | Status: SHIPPED | OUTPATIENT
Start: 2024-08-06

## 2024-08-06 NOTE — TELEPHONE ENCOUNTER
Pt had sent a [employer] message on 08/02/24 wanting to cut back on her protonix 40mg to a 20mg tablet due to some possible side effects.  See message 08/02/24.  MICHELLE Bryson approved.  Pt is asking for it to be sent to he pharmacy.

## 2024-09-23 ENCOUNTER — TRANSCRIBE ORDERS (OUTPATIENT)
Dept: ADMINISTRATIVE | Facility: HOSPITAL | Age: 44
End: 2024-09-23
Payer: COMMERCIAL

## 2024-09-23 DIAGNOSIS — Z12.31 BREAST CANCER SCREENING BY MAMMOGRAM: Primary | ICD-10-CM

## 2024-11-13 ENCOUNTER — HOSPITAL ENCOUNTER (OUTPATIENT)
Dept: MAMMOGRAPHY | Facility: HOSPITAL | Age: 44
Discharge: HOME | End: 2024-11-13
Admitting: NURSE PRACTITIONER
Payer: COMMERCIAL

## 2024-11-13 DIAGNOSIS — Z12.31 BREAST CANCER SCREENING BY MAMMOGRAM: ICD-10-CM

## 2024-11-13 PROCEDURE — 77063 BREAST TOMOSYNTHESIS BI: CPT

## 2024-11-13 PROCEDURE — 77067 SCR MAMMO BI INCL CAD: CPT

## 2024-11-14 DIAGNOSIS — N63.21 MASS OF UPPER OUTER QUADRANT OF LEFT BREAST: Primary | ICD-10-CM

## 2024-11-21 ENCOUNTER — HOSPITAL ENCOUNTER (OUTPATIENT)
Dept: ULTRASOUND IMAGING | Facility: HOSPITAL | Age: 44
Discharge: HOME | End: 2024-11-21
Payer: COMMERCIAL

## 2024-11-21 ENCOUNTER — HOSPITAL ENCOUNTER (OUTPATIENT)
Dept: MAMMOGRAPHY | Facility: HOSPITAL | Age: 44
Discharge: HOME | End: 2024-11-21
Payer: COMMERCIAL

## 2024-11-21 DIAGNOSIS — N63.21 MASS OF UPPER OUTER QUADRANT OF LEFT BREAST: ICD-10-CM

## 2024-11-21 PROCEDURE — 76642 ULTRASOUND BREAST LIMITED: CPT

## 2024-12-03 ENCOUNTER — OFFICE VISIT (OUTPATIENT)
Dept: GASTROENTEROLOGY | Facility: CLINIC | Age: 44
End: 2024-12-03
Payer: COMMERCIAL

## 2024-12-03 ENCOUNTER — TELEPHONE (OUTPATIENT)
Dept: GASTROENTEROLOGY | Facility: CLINIC | Age: 44
End: 2024-12-03

## 2024-12-03 VITALS
HEIGHT: 65 IN | BODY MASS INDEX: 24.36 KG/M2 | WEIGHT: 146.2 LBS | DIASTOLIC BLOOD PRESSURE: 86 MMHG | HEART RATE: 82 BPM | SYSTOLIC BLOOD PRESSURE: 133 MMHG

## 2024-12-03 DIAGNOSIS — R12 HEARTBURN: ICD-10-CM

## 2024-12-03 DIAGNOSIS — R10.13 DYSPEPSIA: Primary | ICD-10-CM

## 2024-12-03 DIAGNOSIS — Z12.11 ENCOUNTER FOR SCREENING FOR MALIGNANT NEOPLASM OF COLON: ICD-10-CM

## 2024-12-03 PROCEDURE — 99214 OFFICE O/P EST MOD 30 MIN: CPT | Performed by: NURSE PRACTITIONER

## 2024-12-03 RX ORDER — ONDANSETRON 8 MG/1
8 TABLET, ORALLY DISINTEGRATING ORAL EVERY 8 HOURS PRN
Qty: 20 TABLET | Refills: 0 | Status: SHIPPED | OUTPATIENT
Start: 2024-12-03

## 2024-12-03 RX ORDER — SODIUM, POTASSIUM,MAG SULFATES 17.5-3.13G
2 SOLUTION, RECONSTITUTED, ORAL ORAL TAKE AS DIRECTED
Qty: 354 ML | Refills: 0 | Status: SHIPPED | OUTPATIENT
Start: 2024-12-03

## 2024-12-03 RX ORDER — SOD SULF/POT CHLORIDE/MAG SULF 1.479 G
12 TABLET ORAL TAKE AS DIRECTED
Qty: 24 TABLET | Refills: 0 | Status: SHIPPED | OUTPATIENT
Start: 2024-12-03 | End: 2024-12-03 | Stop reason: CLARIF

## 2024-12-03 NOTE — PATIENT INSTRUCTIONS
Food Choices for Gastroesophageal Reflux Disease, Adult  When you have gastroesophageal reflux disease (GERD), the foods you eat and your eating habits are very important. Choosing the right foods can help ease your discomfort. Think about working with a food expert (dietitian) to help you make good choices.  What are tips for following this plan?  Reading food labels  Look for foods that are low in saturated fat. Foods that may help with your symptoms include:  Foods that have less than 5% of daily value (DV) of fat.  Foods that have 0 grams of trans fat.  Cooking  Do not garduno your food.  Cook your food by baking, steaming, grilling, or broiling. These are all methods that do not need a lot of fat for cooking.  To add flavor, try to use herbs that are low in spice and acidity.  Meal planning    Choose healthy foods that are low in fat, such as:  Fruits and vegetables.  Whole grains.  Low-fat dairy products.  Lean meats, fish, and poultry.  Eat small meals often instead of eating 3 large meals each day. Eat your meals slowly in a place where you are relaxed. Avoid bending over or lying down until 2-3 hours after eating.  Limit high-fat foods such as fatty meats or fried foods.  Limit your intake of fatty foods, such as oils, butter, and shortening.  Avoid the following as told by your doctor:  Foods that cause symptoms. These may be different for different people. Keep a food diary to keep track of foods that cause symptoms.  Alcohol.  Drinking a lot of liquid with meals.  Eating meals during the 2-3 hours before bed.  Lifestyle  Stay at a healthy weight. Ask your doctor what weight is healthy for you. If you need to lose weight, work with your doctor to do so safely.  Exercise for at least 30 minutes on 5 or more days each week, or as told by your doctor.  Wear loose-fitting clothes.  Do not smoke or use any products that contain nicotine or tobacco. If you need help quitting, ask your doctor.  Sleep with the head  of your bed higher than your feet. Use a wedge under the mattress or blocks under the bed frame to raise the head of the bed.  Chew sugar-free gum after meals.  What foods should eat?    Eat a healthy, well-balanced diet of fruits, vegetables, whole grains, low-fat dairy products, lean meats, fish, and poultry. Each person is different.  Foods that may cause symptoms in one person may not cause any symptoms in another person. Work with your doctor to find foods that are safe for you.  The items listed above may not be a complete list of what you can eat and drink. Contact a food expert for more options.  What foods should I avoid?  Limiting some of these foods may help in managing the symptoms of GERD. Everyone is different. Talk with a food expert or your doctor to help you find the exact foods to avoid, if any.  Fruits  Any fruits prepared with added fat. Any fruits that cause symptoms. For some people, this may include citrus fruits, such as oranges, grapefruit, pineapple, and aretha.  Vegetables  Deep-fried vegetables. French fries. Any vegetables prepared with added fat. Any vegetables that cause symptoms. For some people, this may include tomatoes and tomato products, chili peppers, onions and garlic, and horseradish.  Grains  Pastries or quick breads with added fat.  Meats and other proteins  High-fat meats, such as fatty beef or pork, hot dogs, ribs, ham, sausage, salami, and membreno. Fried meat or protein, including fried fish and fried chicken. Nuts and nut butters, in large amounts.  Dairy  Whole milk and chocolate milk. Sour cream. Cream. Ice cream. Cream cheese. Milkshakes.  Fats and oils  Butter. Margarine. Shortening. Ghee.  Beverages  Coffee and tea, with or without caffeine. Carbonated beverages. Sodas. Energy drinks. Fruit juice made with acidic fruits, such as orange or grapefruit. Tomato juice. Alcoholic drinks.  Sweets and desserts  Chocolate and cocoa. Donuts.  Seasonings and condiments  Pepper.  Peppermint and spearmint. Added salt. Any condiments, herbs, or seasonings that cause symptoms. For some people, this may include andrade, hot sauce, or vinegar-based salad dressings.  The items listed above may not be a complete list of what you should not eat and drink. Contact a food expert for more options.  Questions to ask your doctor  Diet and lifestyle changes are often the first steps that are taken to manage symptoms of GERD. If diet and lifestyle changes do not help, talk with your doctor about taking medicines.  Where to find more information  [employer] G[employer]intestinal [employer]: aboutgerd.org  Summary  When you have GERD, food and lifestyle choices are very important in easing your symptoms.  Eat small meals often instead of 3 large meals a day. Eat your meals slowly and in a place where you are relaxed.  Avoid bending over or lying down until 2-3 hours after eating.  Limit high-fat foods such as fatty meats or fried foods.  This information is not intended to replace advice given to you by your health care provider. Make sure you discuss any questions you have with your health care provider.  Document Revised: 06/28/2021 Document Reviewed: 06/28/2021  Elsevier Patient Education © 2024 [employer]

## 2024-12-03 NOTE — PROGRESS NOTES
Chief Complaint     Heartburn, Difficulty Swallowing, sour stomach, and Constipation    History of Present Illness     Lamar Kirk is a 44 y.o. female who presents to [facility] on referral from BASHIR Beyer for a gastroenterology evaluation of heartburn, dysphagia, nausea and constipation.      Reports acid reflux and sour stomach.  If she eats a bland diet, symptoms are improved.      Prescribed Protonix in July and reports that it's helpful if she takes it daily, but doesn't want to take medication daily.      Reports that acid reflux is worse at night.      Reports intermittent dysphagia when symptoms began, but this improved with Protonix.      Has a bowel movement every four days.  This is her baseline.  Denies abdominal pain and rectal bleeding.       History      Past Medical History:   Diagnosis Date    Cervical dysplasia     Missed      MVP (mitral valve prolapse)     no c/o cp or soa, follows w/ a.  osiris       Past Surgical History:   Procedure Laterality Date    D & C WITH SUCTION N/A 2022    Procedure: DILATATION AND CURETTAGE WITH SUCTION;  Surgeon: Willie Morales MD;  Location: [facility] MAIN OR;  Service: Gynecology;  Laterality: N/A;    LEEP N/A 2022    Procedure: LOOP ELECTROCAUTERY EXCISION PROCEDURE;  Surgeon: Willie Morales MD;  Location: [facility] MAIN OR;  Service: Gynecology;  Laterality: N/A;       Family History   Problem Relation Age of Onset    Aortic aneurysm Father     Lung cancer Father         Current Medications        Current Outpatient Medications:     pantoprazole (Protonix) 20 MG EC tablet, Take 1 tablet by mouth Daily., Disp: 90 tablet, Rfl: 0    ondansetron ODT (ZOFRAN-ODT) 8 MG disintegrating tablet, Place 1 tablet on the tongue Every 8 (Eight) Hours As Needed for Nausea or Vomiting., Disp: 20 tablet, Rfl: 0    Sodium Sulfate-Mag Sulfate-KCl (Sutab) 8284-442-882 MG tablet, Take 12 tablets by mouth Take As  "Directed. Take 12 tablets at 6 pm and 12 tablets 4 hours prior to procedure., Disp: 24 tablet, Rfl: 0     Allergies     Allergies   Allergen Reactions    Bactrim [Sulfamethoxazole-Trimethoprim] Rash       Social History       Social History     Social History Narrative    Not on file       Immunizations     Immunization:    There is no immunization history on file for this patient.       Objective     Objective     Vital Signs:   /86 (BP Location: Left arm, Patient Position: Sitting, Cuff Size: Adult)   Pulse 82   Ht 165.1 cm (65\")   Wt 66.3 kg (146 lb 3.2 oz)   BMI 24.33 kg/m²       Physical Exam    Results      Result Review :   The following data was reviewed by: JUDD Davies on 12/03/2024:    CBC w/diff          2/21/2024    17:01   CBC w/Diff   WBC 6.36    RBC 4.14    Hemoglobin 12.4    Hematocrit 36.9    MCV 89.1    MCH 30.0    MCHC 33.6    RDW 11.9    Platelets 389    Neutrophil Rel % 54.3    Immature Granulocyte Rel % 0.2    Lymphocyte Rel % 36.2    Monocyte Rel % 6.8    Eosinophil Rel % 1.6    Basophil Rel % 0.9      CMP          2/21/2024    17:01   CMP   Glucose 94    BUN 11    Creatinine 0.89    EGFR 82.1    Sodium 138    Potassium 4.5    Chloride 105    Calcium 9.0    Total Protein 6.9    Albumin 3.8    Globulin 3.1    Total Bilirubin 0.5    Alkaline Phosphatase 37    AST (SGOT) 26    ALT (SGPT) 19    Albumin/Globulin Ratio 1.2    BUN/Creatinine Ratio 12.4    Anion Gap 10.8               Assessment and Plan        Assessment and Plan    Diagnoses and all orders for this visit:    1. Dyspepsia (Primary)  -     Case Request; Standing  -     Case Request  -     ondansetron ODT (ZOFRAN-ODT) 8 MG disintegrating tablet; Place 1 tablet on the tongue Every 8 (Eight) Hours As Needed for Nausea or Vomiting.  Dispense: 20 tablet; Refill: 0    2. Heartburn  -     Case Request; Standing  -     Case Request    3. Encounter for screening for malignant neoplasm of colon  -     Case Request; " Standing  -     Case Request    Other orders  -     Follow Anesthesia Guidelines / Protocol; Future  -     Verify NPO; Standing  -     Verify Bowel Prep Was Successful; Standing  -     Give Tap Water Enema If Bowel Prep Insufficient; Standing  -     Sodium Sulfate-Mag Sulfate-KCl (Sutab) 4869-011-784 MG tablet; Take 12 tablets by mouth Take As Directed. Take 12 tablets at 6 pm and 12 tablets 4 hours prior to procedure.  Dispense: 24 tablet; Refill: 0        ESOPHAGOGASTRODUODENOSCOPY AND COLONOSCOPY FOR SCREENING (N/A)  The risk of the endoscopy were discussed in detail. Possible risks/complications, benefits, and alternatives to surgical or invasive procedure have been explained to patient and/or legal guardian; risks include bleeding, infection, and perforation. Patient has been evaluated and can tolerate anesthesia and/or sedation.       Follow Up        Follow Up   Return in about 6 months (around 6/3/2025) for heartburn and dyspepsia .  Patient was given instructions and counseling regarding her condition or for health maintenance advice. Please see specific information pulled into the AVS if appropriate.

## 2024-12-03 NOTE — TELEPHONE ENCOUNTER
Caller: ABEBE KARIMI    Relationship: SELF    Best call back number: [phone]    Requested Prescriptions: SUPREP       Pharmacy where request should be sent:     PHARMACY IN [place]  Last office visit with prescribing clinician: 12/3/2024   Last telemedicine visit with prescribing clinician: Visit date not found   Next office visit with prescribing clinician: 6/25/2025     Additional details provided by patient: PT'S INSURANCE WILL NOT PAY FOR SUTAB, PT REQUESTING SUPREP INSTEAD    Does the patient have less than a 3 day supply:  [x] Yes  [] No    Would you like a call back once the refill request has been completed: [x] Yes [] No    If the office needs to give you a call back, can they leave a voicemail: [x] Yes [] No    Keiry Schuler Rep   12/03/24 11:32 EST      For information on Fall & Injury Prevention, visit [email or web address]

## 2024-12-12 ENCOUNTER — PATIENT ROUNDING (BHMG ONLY) (OUTPATIENT)
Dept: GASTROENTEROLOGY | Facility: CLINIC | Age: 44
End: 2024-12-12
Payer: COMMERCIAL

## 2024-12-12 NOTE — PROGRESS NOTES
12/12/2024      Hello, may I speak with Lamar Kirk     My name is Jyotsna. I am calling from [facility]. I show that you had a recent visit with JUDD Woo.    Before we get started may I verify your date of birth? 1980    I am calling to officially welcome you to our practice and ask about your recent visit. Is this a good time to talk?  LVM ok per BROCK    Tell me about your visit with us. What things went well?    We strive to ensure that we protect your safety and privacy. Is there anything we could have done to improve this during your visit?        We're always looking for ways to make our patients' experiences even better. Do you have recommendations on ways we may improve?    Overall were you satisfied with your first visit to our practice?    I appreciate you taking the time to speak with me today. Is there anything else I can do for you?    I am glad to hear that you had a very good visit and I appreciate you taking the time to provide feedback on this call. We would greatly appreciate you filling out a survey if you receive one in the mail, email or text. This is a great opportunity to provide any additional feedback that you may think of after this call as well.       Thank you, and have a great day.

## 2025-01-09 NOTE — PRE-PROCEDURE INSTRUCTIONS
"Instructed on date and arrival time of 1130. Instructed that arrival time is not their procedure time but allows time to prepare for procedure.  Come to entrance \"C\". Must have  over age 18 to drive home.  May have two visitors; however, children under 12 must stay in waiting room.  Discussed clear liquid diet (no red or purple), bowel prep, and NPO.  May take medications as usual except for blood thinners, diabetic medications, and weight loss medications.  Verbalized understanding of instructions given.  Instructed to call for questions or concerns.  "

## 2025-01-14 ENCOUNTER — ANESTHESIA EVENT (OUTPATIENT)
Dept: GASTROENTEROLOGY | Facility: HOSPITAL | Age: 45
End: 2025-01-14
Payer: COMMERCIAL

## 2025-01-14 NOTE — ANESTHESIA PREPROCEDURE EVALUATION
Anesthesia Evaluation     Patient summary reviewed, Nursing notes reviewed and pregnancy test completed   NPO Solid Status: > 8 hours  NPO Liquid Status: > 4 hours           Airway   Mallampati: I  TM distance: >3 FB  Neck ROM: full  No difficulty expected  Dental - normal exam     Pulmonary - normal exam    breath sounds clear to auscultation  Cardiovascular - normal exam  Exercise tolerance: good (4-7 METS)    Rhythm: regular  Rate: normal    (+) valvular problems/murmurs MVP and MR    ROS comment: Echo 05/07/2024--attempting to obtain results from [facility]    Neuro/Psych  GI/Hepatic/Renal/Endo    (+) GERD well controlled    Musculoskeletal     Abdominal    Substance History      OB/GYN          Other        ROS/Med Hx Other: dyspepsia    HCG ordered    5/7/24 Echo- EF 55.3%  Overall LV function normal, trace MR                Anesthesia Plan    ASA 2     general   total IV anesthesia  (Total IV Anesthesia    Patient understands anesthesia not responsible for dental damage.      Discussed risks with pt including aspiration, allergic reactions, apnea, advanced airway placement. Pt verbalized understanding. All questions answered.   )  intravenous induction     Anesthetic plan, risks, benefits, and alternatives have been provided, discussed and informed consent has been obtained with: patient.  Pre-procedure education provided  Plan discussed with CRNA.    CODE STATUS:

## 2025-01-15 ENCOUNTER — HOSPITAL ENCOUNTER (OUTPATIENT)
Facility: HOSPITAL | Age: 45
Setting detail: HOSPITAL OUTPATIENT SURGERY
Discharge: HOME | End: 2025-01-15
Attending: INTERNAL MEDICINE | Admitting: INTERNAL MEDICINE
Payer: COMMERCIAL

## 2025-01-15 ENCOUNTER — ANESTHESIA (OUTPATIENT)
Dept: GASTROENTEROLOGY | Facility: HOSPITAL | Age: 45
End: 2025-01-15
Payer: COMMERCIAL

## 2025-01-15 VITALS
TEMPERATURE: 97.8 F | HEART RATE: 69 BPM | WEIGHT: 147.71 LBS | SYSTOLIC BLOOD PRESSURE: 108 MMHG | OXYGEN SATURATION: 99 % | RESPIRATION RATE: 17 BRPM | DIASTOLIC BLOOD PRESSURE: 92 MMHG | BODY MASS INDEX: 24.58 KG/M2

## 2025-01-15 DIAGNOSIS — Z12.11 ENCOUNTER FOR SCREENING FOR MALIGNANT NEOPLASM OF COLON: ICD-10-CM

## 2025-01-15 DIAGNOSIS — R10.13 DYSPEPSIA: ICD-10-CM

## 2025-01-15 DIAGNOSIS — R12 HEARTBURN: ICD-10-CM

## 2025-01-15 LAB — B-HCG UR QL: NEGATIVE

## 2025-01-15 PROCEDURE — 88305 TISSUE EXAM BY PATHOLOGIST: CPT | Performed by: INTERNAL MEDICINE

## 2025-01-15 PROCEDURE — 25010000002 PROPOFOL 10 MG/ML EMULSION

## 2025-01-15 PROCEDURE — 25810000003 LACTATED RINGERS PER 1000 ML: Performed by: NURSE ANESTHETIST, CERTIFIED REGISTERED

## 2025-01-15 PROCEDURE — 81025 URINE PREGNANCY TEST: CPT | Performed by: NURSE ANESTHETIST, CERTIFIED REGISTERED

## 2025-01-15 PROCEDURE — 25010000002 LIDOCAINE PF 2% 2 % SOLUTION

## 2025-01-15 DEVICE — DEV CLIP ENDO RESOLUTION360 CONTRL ROT 235CM: Type: IMPLANTABLE DEVICE | Site: COLON | Status: FUNCTIONAL

## 2025-01-15 RX ORDER — PROPOFOL 10 MG/ML
VIAL (ML) INTRAVENOUS AS NEEDED
Status: DISCONTINUED | OUTPATIENT
Start: 2025-01-15 | End: 2025-01-15 | Stop reason: SURG

## 2025-01-15 RX ORDER — LIDOCAINE HYDROCHLORIDE 20 MG/ML
INJECTION, SOLUTION EPIDURAL; INFILTRATION; INTRACAUDAL; PERINEURAL AS NEEDED
Status: DISCONTINUED | OUTPATIENT
Start: 2025-01-15 | End: 2025-01-15 | Stop reason: SURG

## 2025-01-15 RX ORDER — SODIUM CHLORIDE, SODIUM LACTATE, POTASSIUM CHLORIDE, CALCIUM CHLORIDE 600; 310; 30; 20 MG/100ML; MG/100ML; MG/100ML; MG/100ML
30 INJECTION, SOLUTION INTRAVENOUS CONTINUOUS
Status: DISCONTINUED | OUTPATIENT
Start: 2025-01-15 | End: 2025-01-15 | Stop reason: HOSPADM

## 2025-01-15 RX ORDER — PANTOPRAZOLE SODIUM 40 MG/1
40 TABLET, DELAYED RELEASE ORAL DAILY
Qty: 30 TABLET | Refills: 1 | Status: SHIPPED | OUTPATIENT
Start: 2025-01-15 | End: 2025-01-15 | Stop reason: SDUPTHER

## 2025-01-15 RX ORDER — PANTOPRAZOLE SODIUM 40 MG/1
40 TABLET, DELAYED RELEASE ORAL DAILY
Qty: 30 TABLET | Refills: 1 | Status: SHIPPED | OUTPATIENT
Start: 2025-01-15

## 2025-01-15 RX ADMIN — PROPOFOL 250 MCG/KG/MIN: 10 INJECTION, EMULSION INTRAVENOUS at 12:57

## 2025-01-15 RX ADMIN — PROPOFOL 150 MG: 10 INJECTION, EMULSION INTRAVENOUS at 12:55

## 2025-01-15 RX ADMIN — LIDOCAINE HYDROCHLORIDE 80 MG: 20 INJECTION, SOLUTION EPIDURAL; INFILTRATION; INTRACAUDAL; PERINEURAL at 12:55

## 2025-01-15 RX ADMIN — SODIUM CHLORIDE, POTASSIUM CHLORIDE, SODIUM LACTATE AND CALCIUM CHLORIDE: 600; 310; 30; 20 INJECTION, SOLUTION INTRAVENOUS at 12:50

## 2025-01-15 NOTE — H&P
Pre Procedure History & Physical    Chief Complaint:   Heartburn, dyspepsia, screening colonoscopy    Subjective     HPI:   43 yo F here for eval of heartburn, dyspepsia, screening colonoscopy.    Past Medical History:   Past Medical History:   Diagnosis Date    Cervical dysplasia     Missed      MVP (mitral valve prolapse)     no c/o cp or soa, follows w/ a.  osiris       Past Surgical History:  Past Surgical History:   Procedure Laterality Date    D & C WITH SUCTION N/A 2022    Procedure: DILATATION AND CURETTAGE WITH SUCTION;  Surgeon: Willie Morales MD;  Location: [facility] MAIN OR;  Service: Gynecology;  Laterality: N/A;    LEEP N/A 2022    Procedure: LOOP ELECTROCAUTERY EXCISION PROCEDURE;  Surgeon: Willie Morales MD;  Location: [facility] OR;  Service: Gynecology;  Laterality: N/A;       Family History:  Family History   Problem Relation Age of Onset    Aortic aneurysm Father     Lung cancer Father        Social History:   reports that she has never smoked. She has never used smokeless tobacco. She reports that she does not drink alcohol and does not use drugs.    Medications:   Medications Prior to Admission   Medication Sig Dispense Refill Last Dose/Taking    ondansetron ODT (ZOFRAN-ODT) 8 MG disintegrating tablet Place 1 tablet on the tongue Every 8 (Eight) Hours As Needed for Nausea or Vomiting. 20 tablet 0     pantoprazole (Protonix) 20 MG EC tablet Take 1 tablet by mouth Daily. 90 tablet 0        Allergies:  Bactrim [sulfamethoxazole-trimethoprim]    ROS:    Pertinent items are noted in HPI     Objective     Weight 67 kg (147 lb 11.3 oz).    Physical Exam   Constitutional: Pt is oriented to person, place, and time and well-developed, well-nourished, and in no distress.   Mouth/Throat: Oropharynx is clear and moist.   Neck: Normal range of motion.   Cardiovascular: Normal rate, regular rhythm and normal heart sounds.    Pulmonary/Chest: Effort normal and breath sounds normal.    Abdominal: Soft. Nontender  Skin: Skin is warm and dry.   Psychiatric: Mood, memory, affect and judgment normal.     Assessment & Plan     Diagnosis:  Heartburn, dyspepsia, screening colonoscopy    Anticipated Surgical Procedure:  EGD/colonoscopy    The risks, benefits, and alternatives of this procedure have been discussed with the patient or the responsible party- the patient understands and agrees to proceed.

## 2025-01-15 NOTE — ANESTHESIA POSTPROCEDURE EVALUATION
Patient: Lamar Kirk    Procedure Summary       Date: 01/15/25 Room / Location: [facility] ENDOSCOPY 3 / [facility] ENDOSCOPY    Anesthesia Start: 1250 Anesthesia Stop: 1335    Procedures:       COLONOSCOPY WITH POLYPECTOMY/SNARE/CLIP APPLICATION X2      ESOPHAGOGASTRODUODENOSCOPY WITH BIOPSIES Diagnosis:       Dyspepsia      Heartburn      Encounter for screening for malignant neoplasm of colon      (Dyspepsia [R10.13])      (Heartburn [R12])      (Encounter for screening for malignant neoplasm of colon [Z12.11])    Surgeons: Amanda Champagne MD Provider: Monalisa Bustos CRNA    Anesthesia Type: general ASA Status: 2            Anesthesia Type: general    Vitals  Vitals Value Taken Time   /92 01/15/25 1350   Temp 36.6 °C (97.8 °F) 01/15/25 1350   Pulse 72 01/15/25 1351   Resp 17 01/15/25 1350   SpO2 100 % 01/15/25 1351   Vitals shown include unfiled device data.        Post Anesthesia Care and Evaluation    Patient location during evaluation: bedside  Patient participation: complete - patient participated  Level of consciousness: awake  Pain management: adequate    Airway patency: patent  Anesthetic complications: No anesthetic complications  PONV Status: controlled  Cardiovascular status: acceptable and stable  Respiratory status: acceptable

## 2025-01-21 ENCOUNTER — PREP FOR SURGERY (OUTPATIENT)
Dept: OTHER | Facility: HOSPITAL | Age: 45
End: 2025-01-21
Payer: COMMERCIAL

## 2025-01-21 ENCOUNTER — TELEPHONE (OUTPATIENT)
Dept: GASTROENTEROLOGY | Facility: CLINIC | Age: 45
End: 2025-01-21
Payer: COMMERCIAL

## 2025-01-21 DIAGNOSIS — K20.90 ESOPHAGITIS DETERMINED BY BIOPSY: Primary | ICD-10-CM

## 2025-01-21 NOTE — TELEPHONE ENCOUNTER
----- Message from Arlette Owusu sent at 1/20/2025 12:36 PM EST -----  Please arrange for repeat EGD in 8 weeks to f/u on esophagitis.  Increased dose of protonix sent following procedure.      Colon polyps benign.  Place in recall for repeat colonoscopy in 5 years.

## 2025-01-21 NOTE — TELEPHONE ENCOUNTER
Attempted to contact patient, left voicemail message to return call. Provided direct contact information.    Follow up with JUDD Gordon on 06.25.25 at 1130.    Care Gap updated:  5 year colon recall placed    CR entered for second sign.  No clearances needed

## 2025-01-22 NOTE — TELEPHONE ENCOUNTER
Patient returned call.    Spoke to patient and informed of JUDD Gordon result note and recommendations. Verified patient understanding.    Confirmed patient is taking pantoprazole. Educated on why and how best to take PPI medications.      Patient is agreeable to repeat EGD.  Scheduled EGD with Dr. Champagne on Wednesday, 03.12.25 with 0600 arrival time.  Reviewed prep instructions and need for a  post-procedure.    No clearances needed per OV note 12.03.24.    [employer] message sent.

## 2025-02-24 ENCOUNTER — LAB (OUTPATIENT)
Dept: LAB | Facility: HOSPITAL | Age: 45
End: 2025-02-24
Payer: COMMERCIAL

## 2025-02-24 ENCOUNTER — OFFICE VISIT (OUTPATIENT)
Dept: FAMILY MEDICINE CLINIC | Age: 45
End: 2025-02-24
Payer: COMMERCIAL

## 2025-02-24 VITALS
BODY MASS INDEX: 24.76 KG/M2 | WEIGHT: 148.6 LBS | TEMPERATURE: 97.6 F | HEIGHT: 65 IN | OXYGEN SATURATION: 97 % | SYSTOLIC BLOOD PRESSURE: 122 MMHG | DIASTOLIC BLOOD PRESSURE: 87 MMHG

## 2025-02-24 DIAGNOSIS — Z13.220 SCREENING FOR LIPOID DISORDERS: ICD-10-CM

## 2025-02-24 DIAGNOSIS — Z00.00 ANNUAL PHYSICAL EXAM: Primary | ICD-10-CM

## 2025-02-24 DIAGNOSIS — K21.9 GERD WITHOUT ESOPHAGITIS: ICD-10-CM

## 2025-02-24 DIAGNOSIS — R53.83 OTHER FATIGUE: ICD-10-CM

## 2025-02-24 PROBLEM — O02.1 MISSED ABORTION: Status: RESOLVED | Noted: 2022-06-08 | Resolved: 2025-02-24

## 2025-02-24 PROBLEM — Z28.20 VACCINE REFUSED BY PATIENT: Status: ACTIVE | Noted: 2025-02-24

## 2025-02-24 PROBLEM — Z86.0100 HISTORY OF COLON POLYPS: Status: ACTIVE | Noted: 2025-02-24

## 2025-02-24 PROBLEM — K20.90 ESOPHAGITIS DETERMINED BY BIOPSY: Status: RESOLVED | Noted: 2025-01-21 | Resolved: 2025-02-24

## 2025-02-24 PROBLEM — N60.02 CYST OF LEFT BREAST: Status: ACTIVE | Noted: 2025-02-24

## 2025-02-24 PROBLEM — I34.1 MITRAL VALVE PROLAPSE: Status: ACTIVE | Noted: 2024-02-21

## 2025-02-24 PROBLEM — R10.13 DYSPEPSIA: Status: RESOLVED | Noted: 2024-12-03 | Resolved: 2025-02-24

## 2025-02-24 PROBLEM — Z12.11 ENCOUNTER FOR SCREENING FOR MALIGNANT NEOPLASM OF COLON: Status: RESOLVED | Noted: 2024-12-03 | Resolved: 2025-02-24

## 2025-02-24 PROBLEM — R12 HEARTBURN: Status: RESOLVED | Noted: 2024-12-03 | Resolved: 2025-02-24

## 2025-02-24 PROBLEM — N87.9 CERVICAL DYSPLASIA: Status: RESOLVED | Noted: 2022-04-05 | Resolved: 2025-02-24

## 2025-02-24 LAB
ALBUMIN SERPL-MCNC: 4.4 G/DL (ref 3.5–5.2)
ALBUMIN/GLOB SERPL: 1.6 G/DL
ALP SERPL-CCNC: 43 U/L (ref 39–117)
ALT SERPL W P-5'-P-CCNC: 13 U/L (ref 1–33)
ANION GAP SERPL CALCULATED.3IONS-SCNC: 10 MMOL/L (ref 5–15)
AST SERPL-CCNC: 26 U/L (ref 1–32)
BILIRUB SERPL-MCNC: 0.5 MG/DL (ref 0–1.2)
BUN SERPL-MCNC: 11 MG/DL (ref 6–20)
BUN/CREAT SERPL: 13.4 (ref 7–25)
CALCIUM SPEC-SCNC: 9.3 MG/DL (ref 8.6–10.5)
CHLORIDE SERPL-SCNC: 103 MMOL/L (ref 98–107)
CHOLEST SERPL-MCNC: 182 MG/DL (ref 0–200)
CO2 SERPL-SCNC: 24 MMOL/L (ref 22–29)
CREAT SERPL-MCNC: 0.82 MG/DL (ref 0.57–1)
DEPRECATED RDW RBC AUTO: 40.1 FL (ref 37–54)
EGFRCR SERPLBLD CKD-EPI 2021: 90 ML/MIN/1.73
ERYTHROCYTE [DISTWIDTH] IN BLOOD BY AUTOMATED COUNT: 12.1 % (ref 12.3–15.4)
GLOBULIN UR ELPH-MCNC: 2.7 GM/DL
GLUCOSE SERPL-MCNC: 88 MG/DL (ref 65–99)
HCT VFR BLD AUTO: 38.9 % (ref 34–46.6)
HDLC SERPL-MCNC: 66 MG/DL (ref 40–60)
HGB BLD-MCNC: 13.3 G/DL (ref 12–15.9)
IRON 24H UR-MRATE: 120 MCG/DL (ref 37–145)
IRON SATN MFR SERPL: 29 % (ref 20–50)
LDLC SERPL CALC-MCNC: 104 MG/DL (ref 0–100)
LDLC/HDLC SERPL: 1.57 {RATIO}
MCH RBC QN AUTO: 30.8 PG (ref 26.6–33)
MCHC RBC AUTO-ENTMCNC: 34.2 G/DL (ref 31.5–35.7)
MCV RBC AUTO: 90 FL (ref 79–97)
PLATELET # BLD AUTO: 372 10*3/MM3 (ref 140–450)
PMV BLD AUTO: 9.6 FL (ref 6–12)
POTASSIUM SERPL-SCNC: 4.3 MMOL/L (ref 3.5–5.2)
PROT SERPL-MCNC: 7.1 G/DL (ref 6–8.5)
RBC # BLD AUTO: 4.32 10*6/MM3 (ref 3.77–5.28)
SODIUM SERPL-SCNC: 137 MMOL/L (ref 136–145)
TIBC SERPL-MCNC: 410 MCG/DL (ref 298–536)
TRANSFERRIN SERPL-MCNC: 275 MG/DL (ref 200–360)
TRIGL SERPL-MCNC: 63 MG/DL (ref 0–150)
TSH SERPL DL<=0.05 MIU/L-ACNC: 1.86 UIU/ML (ref 0.27–4.2)
VLDLC SERPL-MCNC: 12 MG/DL (ref 5–40)
WBC NRBC COR # BLD AUTO: 5.47 10*3/MM3 (ref 3.4–10.8)

## 2025-02-24 PROCEDURE — 80061 LIPID PANEL: CPT | Performed by: FAMILY MEDICINE

## 2025-02-24 PROCEDURE — 36415 COLL VENOUS BLD VENIPUNCTURE: CPT | Performed by: FAMILY MEDICINE

## 2025-02-24 PROCEDURE — 84466 ASSAY OF TRANSFERRIN: CPT | Performed by: FAMILY MEDICINE

## 2025-02-24 PROCEDURE — 80050 GENERAL HEALTH PANEL: CPT | Performed by: FAMILY MEDICINE

## 2025-02-24 PROCEDURE — 99396 PREV VISIT EST AGE 40-64: CPT | Performed by: FAMILY MEDICINE

## 2025-02-24 PROCEDURE — 83540 ASSAY OF IRON: CPT | Performed by: FAMILY MEDICINE

## 2025-02-24 NOTE — PROGRESS NOTES
"Chief Complaint  Annual Exam    Subjective          Lamar Kirk presents to [facility]  History of Present Illness  --ANNUAL EXAM, LAST EXAM WAS ONE YEAR AGO, DOES NOT SMOKE, COLONOSCOPY WAS IN 2025, PAP AND MAMMOGRAM ARE UP TO DATE.    --DUE FOR SOME ROUTINE LABS, HAS ALSO NOTED SOME FATIGUE        Allergies   Allergen Reactions    Bactrim [Sulfamethoxazole-Trimethoprim] Rash        Health Maintenance Due   Topic Date Due    TDAP/TD VACCINES (1 - Tdap) Never done    INFLUENZA VACCINE  Never done    COVID-19 Vaccine (1 - 2024-25 season) Never done    ANNUAL PHYSICAL  02/21/2025        Current Outpatient Medications on File Prior to Visit   Medication Sig    pantoprazole (Protonix) 40 MG EC tablet Take 1 tablet by mouth Daily.    [DISCONTINUED] ondansetron ODT (ZOFRAN-ODT) 8 MG disintegrating tablet Place 1 tablet on the tongue Every 8 (Eight) Hours As Needed for Nausea or Vomiting.     No current facility-administered medications on file prior to visit.         There is no immunization history on file for this patient.    Review of Systems   Constitutional:  Positive for fatigue. Negative for activity change, appetite change, chills and fever.   HENT:  Negative for congestion, ear pain, hearing loss, rhinorrhea and sore throat.    Eyes:  Negative for blurred vision and discharge.   Respiratory:  Negative for cough and shortness of breath.    Cardiovascular:  Negative for chest pain, palpitations and leg swelling.   Gastrointestinal:  Negative for abdominal pain, constipation, diarrhea, nausea and vomiting.   Genitourinary:  Negative for dysuria and hematuria.   Musculoskeletal:  Negative for arthralgias and myalgias.   Neurological:  Negative for headache.   Psychiatric/Behavioral:  Negative for depressed mood.         Objective     /87 (BP Location: Left arm, Patient Position: Sitting)   Temp 97.6 °F (36.4 °C) (Oral)   Ht 165.1 cm (65\")   Wt 67.4 kg (148 lb 9.6 oz)   " SpO2 97% Comment: on room air  BMI 24.73 kg/m²       Physical Exam  Vitals and nursing note reviewed.   Constitutional:       General: She is not in acute distress.     Appearance: Normal appearance.   HENT:      Right Ear: Tympanic membrane normal.      Left Ear: Tympanic membrane normal.      Mouth/Throat:      Pharynx: Oropharynx is clear.   Eyes:      Conjunctiva/sclera: Conjunctivae normal.   Cardiovascular:      Rate and Rhythm: Normal rate and regular rhythm.      Heart sounds: Normal heart sounds. No murmur heard.  Pulmonary:      Effort: Pulmonary effort is normal.      Breath sounds: Normal breath sounds.   Abdominal:      General: Bowel sounds are normal.      Palpations: Abdomen is soft.      Tenderness: There is no abdominal tenderness.   Musculoskeletal:      Cervical back: Neck supple.      Right lower leg: No edema.      Left lower leg: No edema.   Lymphadenopathy:      Cervical: No cervical adenopathy.   Neurological:      General: No focal deficit present.      Mental Status: She is alert.      Cranial Nerves: No cranial nerve deficit.      Coordination: Coordination normal.      Gait: Gait normal.   Psychiatric:         Mood and Affect: Mood normal.         Behavior: Behavior normal.         Result Review :                             Assessment and Plan      Diagnoses and all orders for this visit:    1. Annual physical exam (Primary)  Assessment & Plan:  ADVICE GIVEN RE:  SEATBELT USE, ALCOHOL USE, HEALTHY DIET, ROUTINE EYE AND DENTAL EXAM, ROUTINE VACCINATIONS.    DECLINES FLU AND COVID VACCINES       2. GERD without esophagitis  -     CBC (No Diff)  -     Comprehensive Metabolic Panel    3. Screening for lipoid disorders  -     Lipid Panel    4. Other fatigue  -     Iron Profile  -     TSH Rfx On Abnormal To Free T4        BMI is within normal parameters. No other follow-up for BMI required.           Follow Up     Return in about 3 months (around 5/24/2025).    Patient was given instructions  and counseling regarding her condition or for health maintenance advice. Please see specific information pulled into the AVS if appropriate.

## 2025-03-04 ENCOUNTER — TELEPHONE (OUTPATIENT)
Dept: GASTROENTEROLOGY | Facility: CLINIC | Age: 45
End: 2025-03-04
Payer: COMMERCIAL

## 2025-03-04 NOTE — TELEPHONE ENCOUNTER
Verify source of procedure(s): Office visit  TIME OUT-CONFIRM CORRECT PROCEDURE: yes  Cardiology: Long - pending  Pulmonology: no  Blood thinner: no  GLP-1:  no

## 2025-03-06 ENCOUNTER — TELEPHONE (OUTPATIENT)
Dept: GASTROENTEROLOGY | Facility: CLINIC | Age: 45
End: 2025-03-06
Payer: COMMERCIAL

## 2025-03-07 NOTE — PRE-PROCEDURE INSTRUCTIONS
"PAT call attempted.  No answer.  Detailed message with date and arrival time of 0600 given.  Instructed that arrival time is not procedure time but allows time to prepare for procedure. Come to entrance \"C\"; must have adult  for transportation home; may have two visitors; however, children under 12 must remain in waiting area; instructed on diet/clear liquids/NPO/bowel prep, if needed; may take normal meds two hours prior to arrival time except for blood thinners, antidiabetics, diuretics, and weight loss meds.  Instructed to return call to confirm receipt of instructions and for any questions.  Awaiting cardiac clearance.  " Never smoker

## 2025-03-10 NOTE — TELEPHONE ENCOUNTER
The nurse from Dr Greer office called and gave 2 fax numbers to refax clearance to.   Clearances refaxed to [phone] and [phone].

## 2025-03-11 ENCOUNTER — TELEPHONE (OUTPATIENT)
Dept: GASTROENTEROLOGY | Facility: CLINIC | Age: 45
End: 2025-03-11
Payer: COMMERCIAL

## 2025-03-11 ENCOUNTER — ANESTHESIA EVENT (OUTPATIENT)
Dept: GASTROENTEROLOGY | Facility: HOSPITAL | Age: 45
End: 2025-03-11
Payer: COMMERCIAL

## 2025-03-11 NOTE — TELEPHONE ENCOUNTER
Pt aware that we may need to reschedule procedure.   Pt states that Dr Long only sees the patient once a year for a follow up. Please advise if pt needs to r/s.

## 2025-03-11 NOTE — TELEPHONE ENCOUNTER
Attempted to contact pt to advise her we do not have cardiac clearance yet, and to ask her to reach out to  office. If we do not have clearance procedure will need canceled.

## 2025-03-11 NOTE — ANESTHESIA PREPROCEDURE EVALUATION
Anesthesia Evaluation     Patient summary reviewed and Nursing notes reviewed   NPO Solid Status: > 8 hours  NPO Liquid Status: > 8 hours           Airway   Mallampati: I  TM distance: >3 FB  Neck ROM: full  No difficulty expected  Dental - normal exam     Pulmonary     breath sounds clear to auscultation  Cardiovascular - normal exam  Exercise tolerance: good (4-7 METS)    ECG reviewed  Rhythm: regular  Rate: normal    (+) valvular problems/murmurs MVP      Neuro/Psych  GI/Hepatic/Renal/Endo    (+) GERD poorly controlled    Musculoskeletal     Abdominal    Substance History      OB/GYN          Other        ROS/Med Hx Other: EKG 4/25/2025  SR with sinus arrythmia    ECHO 5/7/2024  EF 73%  Overall left ventricular function is normal  Normal mitral valve structure and function-sagging anterior leaflet noted  Trace mitral regurg presentation    03/12/25 0657  Pregnancy, Urine - Urine, Clean Catch  HCG, Urine QL Negative                  Anesthesia Plan    ASA 2     general   total IV anesthesia  (Total IV Anesthesia    Patient understands anesthesia not responsible for dental damage.      Discussed risks with pt including aspiration, allergic reactions, apnea, advanced airway placement. Pt verbalized understanding. All questions answered.   )  intravenous induction     Anesthetic plan, risks, benefits, and alternatives have been provided, discussed and informed consent has been obtained with: patient and spouse/significant other.  Pre-procedure education provided  Plan discussed with CRNA.      CODE STATUS:

## 2025-03-11 NOTE — TELEPHONE ENCOUNTER
Received call from [place] at , They have received the clearance but  has left for the day. She is unsure if they will respond to clearance today.

## 2025-03-12 ENCOUNTER — HOSPITAL ENCOUNTER (OUTPATIENT)
Facility: HOSPITAL | Age: 45
Setting detail: HOSPITAL OUTPATIENT SURGERY
Discharge: HOME | End: 2025-03-12
Attending: INTERNAL MEDICINE | Admitting: INTERNAL MEDICINE
Payer: COMMERCIAL

## 2025-03-12 ENCOUNTER — ANESTHESIA (OUTPATIENT)
Dept: GASTROENTEROLOGY | Facility: HOSPITAL | Age: 45
End: 2025-03-12
Payer: COMMERCIAL

## 2025-03-12 VITALS
SYSTOLIC BLOOD PRESSURE: 115 MMHG | OXYGEN SATURATION: 100 % | TEMPERATURE: 98.2 F | WEIGHT: 149.03 LBS | BODY MASS INDEX: 24.8 KG/M2 | DIASTOLIC BLOOD PRESSURE: 78 MMHG | HEART RATE: 64 BPM | RESPIRATION RATE: 15 BRPM

## 2025-03-12 DIAGNOSIS — K20.90 ESOPHAGITIS DETERMINED BY BIOPSY: ICD-10-CM

## 2025-03-12 LAB — B-HCG UR QL: NEGATIVE

## 2025-03-12 PROCEDURE — 25810000003 LACTATED RINGERS PER 1000 ML: Performed by: NURSE ANESTHETIST, CERTIFIED REGISTERED

## 2025-03-12 PROCEDURE — 81025 URINE PREGNANCY TEST: CPT | Performed by: INTERNAL MEDICINE

## 2025-03-12 PROCEDURE — 43239 EGD BIOPSY SINGLE/MULTIPLE: CPT | Performed by: INTERNAL MEDICINE

## 2025-03-12 PROCEDURE — 25010000002 PROPOFOL 10 MG/ML EMULSION: Performed by: NURSE ANESTHETIST, CERTIFIED REGISTERED

## 2025-03-12 PROCEDURE — 25010000002 LIDOCAINE PF 2% 2 % SOLUTION: Performed by: NURSE ANESTHETIST, CERTIFIED REGISTERED

## 2025-03-12 PROCEDURE — 88305 TISSUE EXAM BY PATHOLOGIST: CPT | Performed by: INTERNAL MEDICINE

## 2025-03-12 RX ORDER — PROPOFOL 10 MG/ML
VIAL (ML) INTRAVENOUS AS NEEDED
Status: DISCONTINUED | OUTPATIENT
Start: 2025-03-12 | End: 2025-03-12 | Stop reason: SURG

## 2025-03-12 RX ORDER — SODIUM CHLORIDE, SODIUM LACTATE, POTASSIUM CHLORIDE, CALCIUM CHLORIDE 600; 310; 30; 20 MG/100ML; MG/100ML; MG/100ML; MG/100ML
30 INJECTION, SOLUTION INTRAVENOUS CONTINUOUS
Status: DISCONTINUED | OUTPATIENT
Start: 2025-03-12 | End: 2025-03-12 | Stop reason: HOSPADM

## 2025-03-12 RX ORDER — LIDOCAINE HYDROCHLORIDE 20 MG/ML
INJECTION, SOLUTION EPIDURAL; INFILTRATION; INTRACAUDAL; PERINEURAL AS NEEDED
Status: DISCONTINUED | OUTPATIENT
Start: 2025-03-12 | End: 2025-03-12 | Stop reason: SURG

## 2025-03-12 RX ORDER — PANTOPRAZOLE SODIUM 20 MG/1
20 TABLET, DELAYED RELEASE ORAL DAILY
Qty: 90 TABLET | Refills: 0 | Status: SHIPPED | OUTPATIENT
Start: 2025-03-12 | End: 2025-03-13 | Stop reason: SDUPTHER

## 2025-03-12 RX ADMIN — PROPOFOL 250 MCG/KG/MIN: 10 INJECTION, EMULSION INTRAVENOUS at 07:22

## 2025-03-12 RX ADMIN — PROPOFOL 100 MG: 10 INJECTION, EMULSION INTRAVENOUS at 07:22

## 2025-03-12 RX ADMIN — LIDOCAINE HYDROCHLORIDE 60 MG: 20 INJECTION, SOLUTION INTRAVENOUS at 07:22

## 2025-03-12 RX ADMIN — SODIUM CHLORIDE, SODIUM LACTATE, POTASSIUM CHLORIDE, CALCIUM CHLORIDE 30 ML/HR: 20; 30; 600; 310 INJECTION, SOLUTION INTRAVENOUS at 06:40

## 2025-03-12 NOTE — ANESTHESIA POSTPROCEDURE EVALUATION
Patient: Lamar Kirk    Procedure Summary       Date: 03/12/25 Room / Location: [facility] ENDOSCOPY 3 / [facility] END[facility]COPY    Anesthesia Start: 0717 Anesthesia Stop: 0733    Procedure: ESOPHAGOGASTRODUODENOSCOPY WITH BIOPSIES Diagnosis:       Esophagitis determined by biopsy      (Esophagitis determined by biopsy [K20.90])    Surgeons: Amanda Champagne MD Provider: Fiorella Medrano CRNA    Anesthesia Type: general ASA Status: 2            Anesthesia Type: general    Vitals  Vitals Value Taken Time   /78 03/12/25 07:47   Temp 36.8 °C (98.2 °F) 03/12/25 07:46   Pulse 71 03/12/25 07:48   Resp 15 03/12/25 07:46   SpO2 99 % 03/12/25 07:48   Vitals shown include unfiled device data.        Post Anesthesia Care and Evaluation    Post-procedure mental status: acceptable.  Pain management: satisfactory to patient    Airway patency: patent  Anesthetic complications: No anesthetic complications    Cardiovascular status: acceptable  Respiratory status: acceptable    Comments: Per chart review

## 2025-03-12 NOTE — H&P
Pre Procedure History & Physical    Chief Complaint:   F/u of esophagitis    Subjective     HPI:   43 yo F here for f/u of esophagitis.    Past Medical History:   Past Medical History:   Diagnosis Date    Cervical dysplasia     Missed      MVP (mitral valve prolapse)     no c/o cp or soa, follows w/ a.  osiris       Past Surgical History:  Past Surgical History:   Procedure Laterality Date    COLONOSCOPY N/A 01/15/2025    POLYPS    D & C WITH SUCTION N/A 2022    Procedure: DILATATION AND CURETTAGE WITH SUCTION;  Surgeon: Willie Morales MD;  Location: [facility] MAIN OR;  Service: Gynecology;  Laterality: N/A;    ENDOSCOPY N/A 01/15/2025    Procedure: ESOPHAGOGASTRODUODENOSCOPY WITH BIOPSIES;  Surgeon: Amanda Champagne MD;  Location: [facility] ENDOSCOPY;  Service: Gastroenterology;  Laterality: N/A;  ESOPHAGITIS/HIATAL HERNIA    LEEP N/A 2022    Procedure: LOOP ELECTROCAUTERY EXCISION PROCEDURE;  Surgeon: Willie Morales MD;  Location: [facility] MAIN OR;  Service: Gynecology;  Laterality: N/A;       Family History:  Family History   Problem Relation Age of Onset    Aortic aneurysm Father     Lung cancer Father        Social History:   reports that she has never smoked. She has never used smokeless tobacco. She reports that she does not drink alcohol and does not use drugs.    Medications:   Medications Prior to Admission   Medication Sig Dispense Refill Last Dose/Taking    pantoprazole (Protonix) 40 MG EC tablet Take 1 tablet by mouth Daily. 30 tablet 1 3/11/2025       Allergies:  Bactrim [sulfamethoxazole-trimethoprim]    ROS:    Pertinent items are noted in HPI     Objective     Blood pressure 113/73, pulse 67, temperature 97.6 °F (36.4 °C), temperature source Temporal, resp. rate 15, weight 67.6 kg (149 lb 0.5 oz), SpO2 100%.    Physical Exam   Constitutional: Pt is oriented to person, place, and time and well-developed, well-nourished, and in no distress.   Mouth/Throat: Oropharynx is clear  and moist.   Neck: Normal range of motion.   Cardiovascular: Normal rate, regular rhythm and normal heart sounds.    Pulmonary/Chest: Effort normal and breath sounds normal.   Abdominal: Soft. Nontender  Skin: Skin is warm and dry.   Psychiatric: Mood, memory, affect and judgment normal.     Assessment & Plan     Diagnosis:  F/u of esophagitis    Anticipated Surgical Procedure:  EGD    The risks, benefits, and alternatives of this procedure have been discussed with the patient or the responsible party- the patient understands and agrees to proceed.

## 2025-03-13 ENCOUNTER — RESULTS FOLLOW-UP (OUTPATIENT)
Dept: GASTROENTEROLOGY | Facility: HOSPITAL | Age: 45
End: 2025-03-13
Payer: COMMERCIAL

## 2025-03-13 RX ORDER — PANTOPRAZOLE SODIUM 20 MG/1
20 TABLET, DELAYED RELEASE ORAL DAILY
Qty: 90 TABLET | Refills: 0 | Status: SHIPPED | OUTPATIENT
Start: 2025-03-13

## 2025-04-25 ENCOUNTER — TELEPHONE (OUTPATIENT)
Dept: FAMILY MEDICINE CLINIC | Age: 45
End: 2025-04-25
Payer: COMMERCIAL

## 2025-04-25 DIAGNOSIS — N60.02 BREAST CYST, LEFT: Primary | ICD-10-CM

## 2025-04-25 NOTE — TELEPHONE ENCOUNTER
US Breast Left Limited (11/21/2024 10:54)     IMPRESSION:  1. Minimally complex left breast cyst with thin avascular septation. The  majority of the cyst is filled with anechoic fluid. Recommend limited  left breast ultrasound in 6 months for reevaluation of this probably  benign finding.     Findings and recommendations were discussed with the patient at the time  of the exam.

## 2025-04-25 NOTE — TELEPHONE ENCOUNTER
Caller: YelenaLamar    Relationship: Self    Best call back number: [phone]     What orders are you requesting (i.e. lab or imaging): MAMMOGRAM     In what timeframe would the patient need to come in: ASAP     Where will you receive your lab/imaging services[facility]     Additional notes: PATIENT RECEIVED A LETTER TO FOLLOW UP WITH A US BREAST EXAM.       Dear Ms. Yelena,     After your last breast imaging exam, it was recommended that you return for a 6-month follow-up breast ultrasound exam. Our records indicate that you are due for this exam on 5/20/2025.      Please call us at  at your earliest convenience to schedule your appointment.

## 2025-05-20 ENCOUNTER — HOSPITAL ENCOUNTER (OUTPATIENT)
Dept: ULTRASOUND IMAGING | Facility: HOSPITAL | Age: 45
Discharge: HOME | End: 2025-05-20
Admitting: NURSE PRACTITIONER
Payer: COMMERCIAL

## 2025-05-20 DIAGNOSIS — N60.02 BREAST CYST, LEFT: ICD-10-CM

## 2025-05-20 PROCEDURE — 76642 ULTRASOUND BREAST LIMITED: CPT

## 2025-05-27 ENCOUNTER — OFFICE VISIT (OUTPATIENT)
Dept: FAMILY MEDICINE CLINIC | Age: 45
End: 2025-05-27
Payer: COMMERCIAL

## 2025-05-27 VITALS
TEMPERATURE: 98.8 F | WEIGHT: 153 LBS | DIASTOLIC BLOOD PRESSURE: 77 MMHG | HEART RATE: 66 BPM | BODY MASS INDEX: 25.49 KG/M2 | OXYGEN SATURATION: 100 % | SYSTOLIC BLOOD PRESSURE: 118 MMHG | HEIGHT: 65 IN

## 2025-05-27 DIAGNOSIS — K21.9 GERD WITHOUT ESOPHAGITIS: Primary | ICD-10-CM

## 2025-05-27 DIAGNOSIS — N60.02 CYST OF LEFT BREAST: ICD-10-CM

## 2025-05-27 DIAGNOSIS — I34.1 MITRAL VALVE PROLAPSE: ICD-10-CM

## 2025-05-27 PROCEDURE — 99214 OFFICE O/P EST MOD 30 MIN: CPT | Performed by: NURSE PRACTITIONER

## 2025-05-27 NOTE — ASSESSMENT & PLAN NOTE
Reviewed her last 2 EGD, follow up with GE as directed, take her rx as directed ; discussed lifestyle modifications

## 2025-05-27 NOTE — PROGRESS NOTES
Chief Complaint   GERD without esophagitis  (3 month follow up )    Subjective          Lamar Kirk presents to [facility]  History of Present Illness  Breast imaging 25  IMPRESSION:  Decrease in size of a now 3 mm complicated/septated cyst in the upper  outer left breast. This is a benign finding. No further work-up for this  is needed. Annual screening mammography is recommended. She is due for  her annual bilateral mammogram in 2025.  She does BSE, the area in her left breast has gotten smaller    Had a physical here in February , saw Dr Galaviz and had labs.   Hyperlipidemia  Current medication: none     Lab Results       Component                Value               Date                       CHOL                     182                 2025                 TRIG                     63                  2025                 HDL                      66 (H)              2025                 LDL                      104 (H)             2025                  History of MVP/Just saw cardiology, Dr Long, earlier this month and holter ordered, has not heard back, turned in the holter 25    PMH/[facility]/ changes since 7-:          History of MVP saw KENNY Fay in the past and wore holter and had echo   GERD      GYNECOLOGICAL HISTORY:  Pap negative 10-23-24/ [facility]      SAB missed      Contraception: not currently sexually active         Surgical History:      3- EGD no metaplasia   1-15-25 EGD metaplasia CLN tubular adenoma   22 D&C cervical dysplasia         Family History:        Father: Aortic Aneurysm;  Lung Cancer; Positive for COPD;     Mother: Healthy     Brother(s): Healthy; 3 brother(s) total     Son(s): Healthy; 1 son(s) total     Daughter(s): Healthy; 1 daughter(s) total     Paternal Grandfather:  at age 80's     Paternal Grandmother: Medical history unknown     Maternal Grandfather:  at age  40's; Cause of death was sepsis     Maternal Grandmother: Healthy         Social History:        Occupation: [employer]     Marital Status:     Children: 2 children               Overall look of health  Onset was at an unknown time.   Pertinent negative symptoms include no abdominal pain, no anorexia, no joint pain, no change in stool, no chest pain, no chills, no congestion, no cough, no diaphoresis, no fatigue, no fever, no headaches, no joint swelling, no myalgias, no nausea, no neck pain, no numbness, no rash, no sore throat, no swollen glands, no dysuria, no vertigo, no visual change, no vomiting and no weakness.         Past Medical History:   Diagnosis Date    Cervical dysplasia     Missed      MVP (mitral valve prolapse)     no c/o cp or soa, follows w/ a.  newell       Allergies   Allergen Reactions    Bactrim [Sulfamethoxazole-Trimethoprim] Rash        Past Surgical History:   Procedure Laterality Date    COLONOSCOPY N/A 01/15/2025    POLYPS    D & C WITH SUCTION N/A 2022    Procedure: DILATATION AND CURETTAGE WITH SUCTION;  Surgeon: Willie Morales MD;  Location: [facility] MAIN OR;  Service: Gynecology;  Laterality: N/A;    ENDOSCOPY N/A 01/15/2025    Procedure: ESOPHAGOGASTRODUODENOSCOPY WITH BIOPSIES;  Surgeon: Amanda Champagne MD;  Location: [facility] ENDOSCOPY;  Service: Gastroenterology;  Laterality: N/A;  ESOPHAGITIS/HIATAL HERNIA    ENDOSCOPY N/A 3/12/2025    Procedure: ESOPHAGOGASTRODUODENOSCOPY WITH BIOPSIES;  Surgeon: Amanda Champagne MD;  Location: [facility] ENDOSCOPY;  Service: Gastroenterology;  Laterality: N/A;  HIATAL HERNIA, IRREGULAR Z LINE    LEEP N/A 2022    Procedure: LOOP ELECTROCAUTERY EXCISION PROCEDURE;  Surgeon: Willie Morales MD;  Location: [facility] MAIN OR;  Service: Gynecology;  Laterality: N/A;        Social History     Tobacco Use    Smoking status: Never    Smokeless tobacco: Never   Substance Use Topics    Alcohol use:  "Never       Family History   Problem Relation Age of Onset    Aortic aneurysm Father     Lung cancer Father         Health Maintenance Due   Topic Date Due    TDAP/TD VACCINES (1 - Tdap) Never done    COVID-19 Vaccine (1 - 2024-25 season) Never done    PAP SMEAR  04/01/2025        Current Outpatient Medications on File Prior to Visit   Medication Sig    pantoprazole (Protonix) 20 MG EC tablet Take 1 tablet by mouth Daily.     No current facility-administered medications on file prior to visit.         There is no immunization history on file for this patient.    Review of Systems   Constitutional:  Negative for chills, diaphoresis, fatigue and fever.   HENT:  Negative for congestion, sore throat and swollen glands.    Respiratory:  Negative for cough and shortness of breath.    Cardiovascular:  Negative for chest pain, palpitations (in the past she did when she was under some stress, not now) and leg swelling.   Gastrointestinal:  Positive for GERD (has changed her diet and is taking PPI, has a follow up with GE in the near future). Negative for abdominal pain, anorexia, nausea and vomiting.   Genitourinary:  Negative for dysuria.   Musculoskeletal:  Negative for joint pain, myalgias and neck pain.   Skin:  Negative for rash.   Neurological:  Negative for vertigo, weakness and numbness.        Objective     Vitals:    05/27/25 1527   BP: 118/77   BP Location: Right arm   Patient Position: Sitting   Cuff Size: Adult   Pulse: 66   Temp: 98.8 °F (37.1 °C)   TempSrc: Oral   SpO2: 100%   Weight: 69.4 kg (153 lb)   Height: 165.1 cm (65\")            Physical Exam  Vitals reviewed.   Constitutional:       General: She is not in acute distress.     Appearance: Normal appearance.   Neck:      Vascular: No carotid bruit.   Cardiovascular:      Rate and Rhythm: Normal rate and regular rhythm.      Heart sounds: Normal heart sounds. No murmur heard.  Pulmonary:      Effort: Pulmonary effort is normal. No respiratory distress.    "   Breath sounds: Normal breath sounds.   Musculoskeletal:      Right lower leg: No edema.      Left lower leg: No edema.   Neurological:      Mental Status: She is alert.   Psychiatric:         Mood and Affect: Mood normal.         Behavior: Behavior normal.         Result Review :     The following data was reviewed by: JUDD Beyer on 05/27/2025:                       Assessment and Plan      Assessment & Plan  GERD without esophagitis  Reviewed her last 2 EGD, follow up with GE as directed, take her rx as directed ; discussed lifestyle modifications        Cyst of left breast  Reviewed ultrasound, will be due her screening mammogram later this year        Mitral valve prolapse  Reviewed recent cardiology note, pt prefers to see [facility], or would like to see if she does not need to follow up with them, will send for her holter results       4-2025 (5-9-2024) normal EF sagging anterior mitral valve leaflet with trace mitral regurgitation.   5-1-2025 Patient appears to be stable asymptomatic. With noted prolapse we will screen 5-day Holter for high risk arrhythmia. Beta-blockers have been deferred due to relative bradycardia in the past.    Additionally has a borderline LDL no significant family history of CAD. Did discuss calcium score for potential stratification will consider scheduling in the future    follow-up 1 year pending results of Holter             BMI is within normal parameters. No other follow-up for BMI required.         Follow Up     Return for follow up pending the holter report results .    Patient was given instructions and counseling regarding her condition or for health maintenance advice. Please see specific information pulled into the AVS if appropriate.

## 2025-05-27 NOTE — ASSESSMENT & PLAN NOTE
Reviewed recent cardiology note, pt prefers to see [facility], or would like to see if she does not need to follow up with them, will send for her holter results       4-2025 (5-9-2024) normal EF sagging anterior mitral valve leaflet with trace mitral regurgitation.   5-1-2025 Patient appears to be stable asymptomatic. With noted prolapse we will screen 5-day Holter for high risk arrhythmia. Beta-blockers have been deferred due to relative bradycardia in the past.    Additionally has a borderline LDL no significant family history of CAD. Did discuss calcium score for potential stratification will consider scheduling in the future    follow-up 1 year pending results of Holter

## 2025-06-25 ENCOUNTER — TELEMEDICINE (OUTPATIENT)
Dept: GASTROENTEROLOGY | Facility: CLINIC | Age: 45
End: 2025-06-25
Payer: COMMERCIAL

## 2025-06-25 DIAGNOSIS — K22.70 BARRETT'S ESOPHAGUS WITHOUT DYSPLASIA: Primary | ICD-10-CM

## 2025-06-25 RX ORDER — OMEPRAZOLE 10 MG/1
10 CAPSULE, DELAYED RELEASE ORAL DAILY
Qty: 90 CAPSULE | Refills: 1 | Status: SHIPPED | OUTPATIENT
Start: 2025-06-25

## 2025-06-25 NOTE — PROGRESS NOTES
Chief Complaint     Heartburn and dyspesia     Patient or patient representative verbalized consent for the use of Ambient Listening during the visit with  JUDD Davies for chart documentation. 2025  11:32 EDT      History of Present Illness     History of Present Illness  The patient presents via telehealth for follow-up of dyspepsia and heartburn.  She consents to treatment via telehealth and is appropriate for telehealth.      She has been managing her symptoms effectively with a daily half-dose of pantoprazole 20 mg since 2025. She reports an improvement in her condition and is currently seeking a refill of her pantoprazole prescription. Her goal is to discontinue the medication, but she acknowledges the need for continued use at this time. She has also been mindful of her dietary intake.    She reports that her bowel movements are not regular, occurring every other day even after increasing her fiber intake. She does not experience any bloating or pressure to have more frequent bowel movements.    PAST SURGICAL HISTORY:  EGD completed on 01/15/2025 revealed grade C esophagitis at the GE junction, a 6 cm hiatal hernia, and diffuse mild inflammation in the gastric antrum. Biopsies were negative for H. pylori and intestinal metaplasia. Repeat EGD performed on 2025 showed focal intestinal metaplasia, a 4 cm hiatal hernia, and a normal stomach and duodenum. Colonoscopy revealed a 5 mm polyp in the sigmoid colon (tubular adenoma) and a 6 mm polyp in the sigmoid colon (sessile serrated lesion), both completely removed.         History      Past Medical History:   Diagnosis Date    Cervical dysplasia     Missed      MVP (mitral valve prolapse)     no c/o cp or soa, follows w/ frederic newell     Past Surgical History:   Procedure Laterality Date    COLONOSCOPY N/A 01/15/2025    POLYPS    D & C WITH SUCTION N/A 2022    Procedure: DILATATION AND CURETTAGE WITH SUCTION;  Surgeon:  Willie Morales MD;  Location: [facility] MAIN OR;  Service: Gynecology;  Laterality: N/A;    ENDOSCOPY N/A 01/15/2025    Procedure: ESOPHAGOGASTRODUODENOSCOPY WITH BIOPSIES;  Surgeon: Amanda Champagne MD;  Location: [facility] ENDOSCOPY;  Service: Gastroenterology;  Laterality: N/A;  ESOPHAGITIS/HIATAL HERNIA    ENDOSCOPY N/A 3/12/2025    Procedure: ESOPHAGOGASTRODUODENOSCOPY WITH BIOPSIES;  Surgeon: Amanda Champagne MD;  Location: [facility] ENDOSCOPY;  Service: Gastroenterology;  Laterality: N/A;  HIATAL HERNIA, IRREGULAR Z LINE    LEEP N/A 06/14/2022    Procedure: LOOP ELECTROCAUTERY EXCISION PROCEDURE;  Surgeon: Willie Morales MD;  Location: [facility] MAIN OR;  Service: Gynecology;  Laterality: N/A;     Family History   Problem Relation Age of Onset    Aortic aneurysm Father     Lung cancer Father         Current Medications       Current Outpatient Medications:     omeprazole (prilOSEC) 10 MG capsule, Take 1 capsule by mouth Daily., Disp: 90 capsule, Rfl: 1     Allergies     Allergies   Allergen Reactions    Bactrim [Sulfamethoxazole-Trimethoprim] Rash       Social History       Social History     Social History Narrative    Not on file         Objective       There were no vitals taken for this visit.      Physical Exam    Results       Result Review :    The following data was reviewed by: JUDD Davies on 06/25/2025:    CBC w/diff          2/24/2025    16:32   CBC w/Diff   WBC 5.47    RBC 4.32    Hemoglobin 13.3    Hematocrit 38.9    MCV 90.0    MCH 30.8    MCHC 34.2    RDW 12.1    Platelets 372      CMP          2/24/2025    16:32   CMP   Glucose 88    BUN 11    Creatinine 0.82    EGFR 90.0    Sodium 137    Potassium 4.3    Chloride 103    Calcium 9.3    Total Protein 7.1    Albumin 4.4    Globulin 2.7    Total Bilirubin 0.5    Alkaline Phosphatase 43    AST (SGOT) 26    ALT (SGPT) 13    Albumin/Globulin Ratio 1.6    BUN/Creatinine Ratio 13.4    Anion Gap 10.0        Iron Profile   Iron    Date Value Ref Range Status   02/24/2025 120 37 - 145 mcg/dL Final     TIBC   Date Value Ref Range Status   02/24/2025 410 298 - 536 mcg/dL Final     Iron Saturation (TSAT)   Date Value Ref Range Status   02/24/2025 29 20 - 50 % Final     Transferrin   Date Value Ref Range Status   02/24/2025 275 200 - 360 mg/dL Final       Results  Diagnostic Testing   - EGD: 01/15/2025, Grade C esophagitis at the GE junction, biopsy with intestinal metaplasia negative for dysplasia, 6 cm hiatal hernia, diffuse mild inflammation characterized by erythema in the gastric antrum, biopsy negative for H. pylori and intestinal metaplasia, 1st and 2nd portion of the duodenum are normal.   - EGD: 03/12/2025, Irregular z-line found 31 cm from the incisors, four quadrant biopsies were obtained, focal intestinal metaplasia negative for dysplasia, 4 cm hiatal hernia, entire examined stomach was normal, biopsy negative for H. pylori, duodenum normal.   - Colonoscopy: Perianal and digital rectal exams were normal, 5 mm polyp in the sigmoid colon tubular adenoma completely removed, 6 mm polyp in the sigmoid colon sessile serrated lesion completely removed, diverticulosis throughout the colon.           Assessment and Plan              Diagnoses and all orders for this visit:    1. Ann's esophagus without dysplasia (Primary)  -     omeprazole (prilOSEC) 10 MG capsule; Take 1 capsule by mouth Daily.  Dispense: 90 capsule; Refill: 1        Assessment & Plan  1. Dyspepsia and heartburn:  - Continue dietary modifications.  - Prescribe omeprazole 10 mg daily for 90 days.  - Monitor for any symptoms of acid reflux.  - Discontinue medication if condition remains stable after 90 days.  - Report any issues immediately.    2. Colon polyps:  - Repeat colonoscopy in 5 years due to previously identified and removed benign polyps.            Follow Up     Follow Up   Return if symptoms worsen or fail to improve, for ann's esophagus.  Patient was given  instructions and counseling regarding her condition or for health maintenance advice. Please see specific information pulled into the AVS if appropriate.

## 2025-07-25 ENCOUNTER — PATIENT MESSAGE (OUTPATIENT)
Dept: GASTROENTEROLOGY | Facility: CLINIC | Age: 45
End: 2025-07-25
Payer: COMMERCIAL

## 2025-07-25 RX ORDER — PANTOPRAZOLE SODIUM 20 MG/1
20 TABLET, DELAYED RELEASE ORAL DAILY
Qty: 90 TABLET | Refills: 1 | Status: SHIPPED | OUTPATIENT
Start: 2025-07-25

## (undated) DEVICE — THE SINGLE USE ETRAP – POLYP TRAP IS USED FOR SUCTION RETRIEVAL OF ENDOSCOPICALLY REMOVED POLYPS.: Brand: ETRAP

## (undated) DEVICE — DRAPE,UNDERBUTTOCKS,PCH,STERILE: Brand: MEDLINE

## (undated) DEVICE — CATH URETH INTRMIT ALLPURP LTX 16F RED

## (undated) DEVICE — MEDI-VAC NON-CONDUCTIVE SUCTION TUBING: Brand: CARDINAL HEALTH

## (undated) DEVICE — NON-ADHERENT PADS PREPACK: Brand: TELFA

## (undated) DEVICE — TOWEL,OR,DSP,ST,BLUE,STD,4/PK,20PK/CS: Brand: MEDLINE

## (undated) DEVICE — SKIN PREP TRAY W/CHG: Brand: MEDLINE INDUSTRIES, INC.

## (undated) DEVICE — SINGLE-USE BIOPSY FORCEPS: Brand: RADIAL JAW 4

## (undated) DEVICE — BLCK/BITE BLOX WO/DENTL/RIM W/STRAP 54F

## (undated) DEVICE — ST COL BERKELY TBG

## (undated) DEVICE — SLV SCD KN/LEN ADJ EXPRSS BLENDED MD 1P/U

## (undated) DEVICE — SOLIDIFIER LIQLOC PLS 1500CC BT

## (undated) DEVICE — Device

## (undated) DEVICE — LITHOTOMY-YELLOW FINS: Brand: MEDLINE INDUSTRIES, INC.

## (undated) DEVICE — PAD GRND REM POLYHESIVE A/ DISP

## (undated) DEVICE — SOL IRR H2O BTL 1000ML STRL

## (undated) DEVICE — PAD SANI MAXI W/ADHS SNG WRP 11IN

## (undated) DEVICE — CONN JET HYDRA H20 AUXILIARY DISP

## (undated) DEVICE — LINER SURG CANSTR SXN S/RIGD 1500CC

## (undated) DEVICE — VACURETTE CRV RIGD 8MM DISP

## (undated) DEVICE — PROCTO SWABS: Brand: DEROYAL

## (undated) DEVICE — MINOR-LF: Brand: MEDLINE INDUSTRIES, INC.

## (undated) DEVICE — SOL IRRG H2O PL/BG 1000ML STRL

## (undated) DEVICE — NDL SPINE 22G 31/2IN BLK

## (undated) DEVICE — LIGHT SLEEVE: Brand: DEVON

## (undated) DEVICE — SYR CONTRL LUERLOK 10CC

## (undated) DEVICE — INTENDED FOR TISSUE SEPARATION, AND OTHER PROCEDURES THAT REQUIRE A SHARP SURGICAL BLADE TO PUNCTURE OR CUT.: Brand: BARD-PARKER ® CARBON RIB-BACK BLADES

## (undated) DEVICE — ELECTRD BALL LLETZ 5MM 13CM STRL

## (undated) DEVICE — THE STERILE LIGHT HANDLE COVER IS USED WITH STERIS SURGICAL LIGHTING AND VISUALIZATION SYSTEMS.

## (undated) DEVICE — STERILE POLYISOPRENE POWDER-FREE SURGICAL GLOVES: Brand: PROTEXIS

## (undated) DEVICE — LEGGINGS, PAIR, CLEAR, STERILE: Brand: MEDLINE

## (undated) DEVICE — SOL IRR NACL 0.9PCT BT 1000ML

## (undated) DEVICE — DEFENDO AIR WATER SUCTION AND BIOPSY VALVE KIT FOR  OLYMPUS: Brand: DEFENDO AIR/WATER/SUCTION AND BIOPSY VALVE

## (undated) DEVICE — SNAR POLYP CAPTIFLEX XS/OVL 11X2.4MM 240CM 1P/U

## (undated) DEVICE — Device: Brand: DEFENDO AIR/WATER/SUCTION AND BIOPSY VALVE

## (undated) DEVICE — GOWN,REINFORCE,POLY,SIRUS,BREATH SLV,XLG: Brand: MEDLINE